# Patient Record
Sex: MALE | Race: WHITE | Employment: FULL TIME | URBAN - METROPOLITAN AREA
[De-identification: names, ages, dates, MRNs, and addresses within clinical notes are randomized per-mention and may not be internally consistent; named-entity substitution may affect disease eponyms.]

---

## 2018-03-28 ENCOUNTER — TELEPHONE (OUTPATIENT)
Dept: FAMILY MEDICINE CLINIC | Facility: CLINIC | Age: 48
End: 2018-03-28

## 2018-03-28 NOTE — TELEPHONE ENCOUNTER
ER F/U was seen at [facility] 03/28 for feeling \"off\".  Pt is coming in on 4/4

## 2018-04-04 ENCOUNTER — OFFICE VISIT (OUTPATIENT)
Dept: FAMILY MEDICINE CLINIC | Facility: CLINIC | Age: 48
End: 2018-04-04

## 2018-04-04 VITALS
BODY MASS INDEX: 39.01 KG/M2 | HEART RATE: 66 BPM | RESPIRATION RATE: 14 BRPM | DIASTOLIC BLOOD PRESSURE: 88 MMHG | HEIGHT: 69 IN | SYSTOLIC BLOOD PRESSURE: 130 MMHG | WEIGHT: 263.38 LBS | TEMPERATURE: 97 F

## 2018-04-04 DIAGNOSIS — R53.83 OTHER FATIGUE: ICD-10-CM

## 2018-04-04 DIAGNOSIS — R03.0 ELEVATED BLOOD PRESSURE READING: ICD-10-CM

## 2018-04-04 DIAGNOSIS — R00.2 PALPITATIONS: Primary | ICD-10-CM

## 2018-04-04 PROCEDURE — 99214 OFFICE O/P EST MOD 30 MIN: CPT | Performed by: FAMILY MEDICINE

## 2018-04-04 PROCEDURE — 36415 COLL VENOUS BLD VENIPUNCTURE: CPT | Performed by: FAMILY MEDICINE

## 2018-04-04 PROCEDURE — 84439 ASSAY OF FREE THYROXINE: CPT | Performed by: FAMILY MEDICINE

## 2018-04-04 PROCEDURE — 84443 ASSAY THYROID STIM HORMONE: CPT | Performed by: FAMILY MEDICINE

## 2018-04-04 PROCEDURE — 84402 ASSAY OF FREE TESTOSTERONE: CPT | Performed by: FAMILY MEDICINE

## 2018-04-04 PROCEDURE — 84403 ASSAY OF TOTAL TESTOSTERONE: CPT | Performed by: FAMILY MEDICINE

## 2018-04-04 NOTE — PROGRESS NOTES
Katlin Valdivia is a 53year old male. CC:  Patient presents with:  ER F/U      HPI:  F/u ER visit to [address] [place] [address] on 3/28/18. He was feeling anxious and felt his heart rate was elevated. He also noted mild SOB and some tingling in his face.  In ER his BP was Resp 14   Ht 69\"   Wt 263 lb 6.4 oz   BMI 38.90 kg/m²    Reviewed by Jim Luna M.D.     Physical Exam:  GEN: well developed, well nourished, in no apparent distress  EYE: B conjunctiva and lids normal  HENT: normocephalic; normal nose, pharynx and TM's

## 2018-04-04 NOTE — PROGRESS NOTES
Edyta Joel presents today for labs. 1 gold, 1 light green drawn from right ac. 1 attempt with straight needle. Phone number provided for Yessenia. Patient left office in stable condition.

## 2018-04-05 ENCOUNTER — TELEPHONE (OUTPATIENT)
Dept: FAMILY MEDICINE CLINIC | Facility: CLINIC | Age: 48
End: 2018-04-05

## 2018-04-05 NOTE — TELEPHONE ENCOUNTER
----- Message from Francisco J Caputo MD sent at 4/5/2018  7:50 AM CDT -----  Please let patient know or leave message that his thyroid function is fine.  We will await Event monitor results and have him relay home BP readings this coming Monday, thanks

## 2018-04-05 NOTE — TELEPHONE ENCOUNTER
Please call [facility] [place] and see if he can get the Event monitor sooner, thanks

## 2018-04-05 NOTE — TELEPHONE ENCOUNTER
Patient advised. Verbalized understanding. Patient advised that he will call Monday with BP readings and has been checking BP at home. Patient advised that he called to set up event monitor and he is currently on a wait list for one.  Was told it may take u

## 2018-04-05 NOTE — TELEPHONE ENCOUNTER
Call to [facility] [place]. States that they only have so many event monitors available at a time and patient is on wait list. Can take up to a month, patient will possibly get monitor sooner, but nothing is available at this time.

## 2018-04-06 NOTE — TELEPHONE ENCOUNTER
Let's have him do one at [place]. Order ready to fax.   Have him call [facility] to schedule   Tel: [phone]   Fax: [phone]   Thanks

## 2018-04-06 NOTE — TELEPHONE ENCOUNTER
Spoke with Ivon Boyd. Stated patient is 9th on list for monitor and they will find a way to squeeze him in when monitor becomes available.

## 2018-04-06 NOTE — TELEPHONE ENCOUNTER
Order faxed to [facility]abe at [phone]. Call to patient and advised order had been faxed and to call [place] to schedule at [phone]. Patient verbalized understanding.

## 2018-04-09 ENCOUNTER — PATIENT MESSAGE (OUTPATIENT)
Dept: FAMILY MEDICINE CLINIC | Facility: CLINIC | Age: 48
End: 2018-04-09

## 2018-04-09 NOTE — TELEPHONE ENCOUNTER
From: Tiny Chung  To: Renetta Lemus MD  Sent: 4/9/2018 3:48 PM CDT  Subject: Other    Here are my BP readings since last Wednesday:  Ixnwackpo-6mf-812/102  Grtyqkbh-4pu-188/103  Tznhqb-8pb-263/98  Qvgrnfok-7ad-784/105  Sunday-12:30pm-135/103  [facility] [facility]

## 2018-04-16 ENCOUNTER — TELEPHONE (OUTPATIENT)
Dept: FAMILY MEDICINE CLINIC | Facility: CLINIC | Age: 48
End: 2018-04-16

## 2018-04-16 DIAGNOSIS — Z13.220 LIPID SCREENING: Primary | ICD-10-CM

## 2018-04-16 NOTE — TELEPHONE ENCOUNTER
I wanted to see Ashtyn Bai back in office about 3 weeks after starting the BP med. That would place us around the end of the month. If he can come fasting at that time we will draw his lipids.  Thanks

## 2018-04-16 NOTE — TELEPHONE ENCOUNTER
Patient advised. Verbalized understanding.      Future Appointments  Date Time Provider Cruz Tapia   5/4/2018 8:00 AM Yanira Scott MD [facility] PRACHI Scruggs

## 2018-04-16 NOTE — TELEPHONE ENCOUNTER
Patient was in about a week and a half ago to see Dr Gia Baeza. Dr Gia Baeza wanted him to come back another day and do lab work. No orders in EPIC. Need orders. Patient is not yet scheduled.

## 2018-05-04 ENCOUNTER — OFFICE VISIT (OUTPATIENT)
Dept: FAMILY MEDICINE CLINIC | Facility: CLINIC | Age: 48
End: 2018-05-04

## 2018-05-04 VITALS
RESPIRATION RATE: 12 BRPM | SYSTOLIC BLOOD PRESSURE: 125 MMHG | HEART RATE: 62 BPM | DIASTOLIC BLOOD PRESSURE: 80 MMHG | TEMPERATURE: 99 F | HEIGHT: 69 IN | WEIGHT: 263 LBS | BODY MASS INDEX: 38.95 KG/M2

## 2018-05-04 DIAGNOSIS — R03.0 ELEVATED BLOOD PRESSURE READING: ICD-10-CM

## 2018-05-04 DIAGNOSIS — R53.83 OTHER FATIGUE: ICD-10-CM

## 2018-05-04 DIAGNOSIS — K21.9 GASTROESOPHAGEAL REFLUX DISEASE, ESOPHAGITIS PRESENCE NOT SPECIFIED: ICD-10-CM

## 2018-05-04 DIAGNOSIS — R00.2 PALPITATIONS: ICD-10-CM

## 2018-05-04 DIAGNOSIS — Z00.00 WELL ADULT EXAM: Primary | ICD-10-CM

## 2018-05-04 PROCEDURE — 99396 PREV VISIT EST AGE 40-64: CPT | Performed by: FAMILY MEDICINE

## 2018-05-04 PROCEDURE — 80061 LIPID PANEL: CPT | Performed by: FAMILY MEDICINE

## 2018-05-04 PROCEDURE — 36415 COLL VENOUS BLD VENIPUNCTURE: CPT | Performed by: FAMILY MEDICINE

## 2018-05-04 NOTE — PROGRESS NOTES
Richard Cisneros is a 53year old male. CC:  Patient presents with:   Follow - Up: on meds      HPI:  Yearly PX   Last lipid: 10/16--elevated T Chol with HDL > 60  Last PSA: na     Immunization History   Administered            Date(s) Administered     Fl Smokeless tobacco: Never Used                      Alcohol use: Yes           1.5 oz/week     Standard drinks or equivalent: 3 per week     Comment: social       ROS:  General: continue with lower energy levels  Cardiovascular/Pulses: Denies exertional Refills for this Visit:  No prescriptions requested or ordered in this encounter      No Follow-up on file.                 Authorized by Aislinn Beck M.D.

## 2018-05-05 ENCOUNTER — TELEPHONE (OUTPATIENT)
Dept: FAMILY MEDICINE CLINIC | Facility: CLINIC | Age: 48
End: 2018-05-05

## 2018-05-05 NOTE — TELEPHONE ENCOUNTER
----- Message from Magui Luong MD sent at 5/5/2018  7:47 AM CDT -----  Please let patient know or leave message that his lipids are elevated and now his good cholesterol (HDL) is < 60. It may be time to start a lipid lowering medication.  I would like to

## 2018-05-09 ENCOUNTER — TELEPHONE (OUTPATIENT)
Dept: FAMILY MEDICINE CLINIC | Facility: CLINIC | Age: 48
End: 2018-05-09

## 2018-05-11 ENCOUNTER — NURSE ONLY (OUTPATIENT)
Dept: FAMILY MEDICINE CLINIC | Facility: CLINIC | Age: 48
End: 2018-05-11

## 2018-05-11 ENCOUNTER — TELEPHONE (OUTPATIENT)
Dept: FAMILY MEDICINE CLINIC | Facility: CLINIC | Age: 48
End: 2018-05-11

## 2018-05-11 DIAGNOSIS — E29.1 TESTICULAR HYPOFUNCTION: Primary | ICD-10-CM

## 2018-05-11 PROCEDURE — 83001 ASSAY OF GONADOTROPIN (FSH): CPT | Performed by: FAMILY MEDICINE

## 2018-05-11 PROCEDURE — 36415 COLL VENOUS BLD VENIPUNCTURE: CPT | Performed by: FAMILY MEDICINE

## 2018-05-11 PROCEDURE — 83036 HEMOGLOBIN GLYCOSYLATED A1C: CPT | Performed by: FAMILY MEDICINE

## 2018-05-11 PROCEDURE — 84402 ASSAY OF FREE TESTOSTERONE: CPT | Performed by: FAMILY MEDICINE

## 2018-05-11 PROCEDURE — 84146 ASSAY OF PROLACTIN: CPT | Performed by: FAMILY MEDICINE

## 2018-05-11 PROCEDURE — 82947 ASSAY GLUCOSE BLOOD QUANT: CPT | Performed by: FAMILY MEDICINE

## 2018-05-11 PROCEDURE — 83002 ASSAY OF GONADOTROPIN (LH): CPT | Performed by: FAMILY MEDICINE

## 2018-05-11 PROCEDURE — 84403 ASSAY OF TOTAL TESTOSTERONE: CPT | Performed by: FAMILY MEDICINE

## 2018-05-11 PROCEDURE — 83525 ASSAY OF INSULIN: CPT | Performed by: FAMILY MEDICINE

## 2018-05-11 RX ORDER — BLOOD SUGAR DIAGNOSTIC
STRIP MISCELLANEOUS
Qty: 100 STRIP | Refills: 1 | Status: SHIPPED | OUTPATIENT
Start: 2018-05-11 | End: 2018-05-11 | Stop reason: CLARIF

## 2018-05-11 NOTE — TELEPHONE ENCOUNTER
Phone call to patient and he advised that he does not check his blood sugars. Phone call to [place] and advised Jadon Vogel of this information per patient .   Test strips removed from patient profile

## 2018-05-11 NOTE — PROGRESS NOTES
Blood work drawn for Dr. Driss Garrett  2 gold  1 mint  1 purple  22 g.  Right antecubital   Fax results to [phone]

## 2018-05-15 ENCOUNTER — MED REC SCAN ONLY (OUTPATIENT)
Dept: FAMILY MEDICINE CLINIC | Facility: CLINIC | Age: 48
End: 2018-05-15

## 2018-05-16 ENCOUNTER — PATIENT MESSAGE (OUTPATIENT)
Dept: FAMILY MEDICINE CLINIC | Facility: CLINIC | Age: 48
End: 2018-05-16

## 2018-05-16 NOTE — TELEPHONE ENCOUNTER
From: Ritchie Hogue  To: Phi George MD  Sent: 5/16/2018 10:12 AM CDT  Subject: Other    I dropped my results off from the heart scan this morning. Also, I haven't received the results from the testosterone blood work I took last Friday.  I received ariella

## 2018-05-30 ENCOUNTER — OFFICE VISIT (OUTPATIENT)
Dept: SLEEP CENTER | Facility: HOSPITAL | Age: 48
End: 2018-05-30
Attending: INTERNAL MEDICINE
Payer: COMMERCIAL

## 2018-05-30 DIAGNOSIS — G47.33 OBSTRUCTIVE SLEEP APNEA (ADULT) (PEDIATRIC): ICD-10-CM

## 2018-05-30 DIAGNOSIS — R06.83 SNORING: ICD-10-CM

## 2018-05-30 DIAGNOSIS — R06.81 APNEA: ICD-10-CM

## 2018-05-30 DIAGNOSIS — G47.10 HYPERSOMNIA: ICD-10-CM

## 2018-05-30 DIAGNOSIS — R35.1 NOCTURIA: ICD-10-CM

## 2018-05-30 PROCEDURE — 95810 POLYSOM 6/> YRS 4/> PARAM: CPT

## 2018-06-04 NOTE — TELEPHONE ENCOUNTER
Please call [facility] and get results for an Event monitor he wore in 4/18. He only wore it for about one week. No results in EMR.   Thanks

## 2018-06-04 NOTE — TELEPHONE ENCOUNTER
Last refill: 4/09/2018 #30 with 1 refill  Last Visit: 5/04/2018  Next Visit: No future appointments. Forward to Dr. Mitch Kim please advise on refills. Thanks.

## 2018-06-06 ENCOUNTER — TELEPHONE (OUTPATIENT)
Dept: FAMILY MEDICINE CLINIC | Facility: CLINIC | Age: 48
End: 2018-06-06

## 2018-06-06 NOTE — PROCEDURES
[address]       Accredited by the [facility] of [facility] Medicine (AASM)    PATIENT'S NAME:        Melany Walters  ATTENDING PHYSICIAN:   Jewel Bean M.D. REFERRING PHYSICIAN:   Jewel Bean M.D.   PATIENT REM predominance, with a REM AHI of 28. OXYGENATION:  The oxygen braeden was 83%. The patient spent 99% of the record with a saturation of greater than 90%. PERIODIC LIMB MOVEMENTS:  PLM index is 23, with a PLM arousal index of 9.     EEG:  With the l

## 2018-06-12 ENCOUNTER — NURSE ONLY (OUTPATIENT)
Dept: FAMILY MEDICINE CLINIC | Facility: CLINIC | Age: 48
End: 2018-06-12

## 2018-06-12 DIAGNOSIS — E29.1 TESTICULAR HYPOFUNCTION: Primary | ICD-10-CM

## 2018-06-12 PROCEDURE — 83036 HEMOGLOBIN GLYCOSYLATED A1C: CPT | Performed by: FAMILY MEDICINE

## 2018-06-12 PROCEDURE — 83001 ASSAY OF GONADOTROPIN (FSH): CPT | Performed by: FAMILY MEDICINE

## 2018-06-12 PROCEDURE — 36415 COLL VENOUS BLD VENIPUNCTURE: CPT | Performed by: FAMILY MEDICINE

## 2018-06-12 PROCEDURE — 84402 ASSAY OF FREE TESTOSTERONE: CPT | Performed by: FAMILY MEDICINE

## 2018-06-12 PROCEDURE — 82947 ASSAY GLUCOSE BLOOD QUANT: CPT | Performed by: FAMILY MEDICINE

## 2018-06-12 PROCEDURE — 83002 ASSAY OF GONADOTROPIN (LH): CPT | Performed by: FAMILY MEDICINE

## 2018-06-12 PROCEDURE — 84403 ASSAY OF TOTAL TESTOSTERONE: CPT | Performed by: FAMILY MEDICINE

## 2018-06-12 PROCEDURE — 83525 ASSAY OF INSULIN: CPT | Performed by: FAMILY MEDICINE

## 2018-06-12 PROCEDURE — 84146 ASSAY OF PROLACTIN: CPT | Performed by: FAMILY MEDICINE

## 2018-06-12 NOTE — PROGRESS NOTES
Blood work drawn for Dr. Jules Dubon  Please fax results to [phone]  2 gold  1 mint  1 purple  22 g.  Left antecubital

## 2018-06-13 ENCOUNTER — MED REC SCAN ONLY (OUTPATIENT)
Dept: FAMILY MEDICINE CLINIC | Facility: CLINIC | Age: 48
End: 2018-06-13

## 2018-06-13 RX ORDER — DILTIAZEM HYDROCHLORIDE 120 MG/1
120 CAPSULE, COATED, EXTENDED RELEASE ORAL DAILY
Qty: 90 CAPSULE | Refills: 1 | Status: SHIPPED
Start: 2018-06-13 | End: 2018-12-07

## 2018-06-13 RX ORDER — DILTIAZEM HYDROCHLORIDE 120 MG/1
CAPSULE, EXTENDED RELEASE ORAL
Qty: 30 CAPSULE | Refills: 2 | OUTPATIENT
Start: 2018-06-13

## 2018-06-13 NOTE — TELEPHONE ENCOUNTER
From: Christiano Vences  Sent: 6/12/2018 10:35 PM CDT  Subject: Medication Renewal Request    Christiano Vences would like a refill of the following medications:     DilTIAZem HCl ER Coated Beads (CARDIZEM CD) 120 MG Oral Capsule SR 24 Hr Vivian Hyatt MD]    P

## 2018-06-13 NOTE — TELEPHONE ENCOUNTER
Last refilled on 4/9/18 for # 30 with 1 refills  Last seen on 5/4/18  Future Appointments  Date Time Provider Cruz Tapia   6/22/2018 8:30 AM Britany Newlel MD [facility] [address] [address]        Thank you.

## 2018-06-13 NOTE — TELEPHONE ENCOUNTER
Medication refilled  Second request has been sent to [facility] for the Event Monitor results.

## 2018-06-18 ENCOUNTER — TELEPHONE (OUTPATIENT)
Dept: FAMILY MEDICINE CLINIC | Facility: CLINIC | Age: 48
End: 2018-06-18

## 2018-06-18 NOTE — TELEPHONE ENCOUNTER
Received Event Monitor Report from Maria Fernanda Walker. Per written order TJ:   \"Let him know no abnormal heart rhythm on the event monitor. \"  Patient notified via [facility] [address].

## 2018-11-09 ENCOUNTER — OFFICE VISIT (OUTPATIENT)
Dept: FAMILY MEDICINE CLINIC | Facility: CLINIC | Age: 48
End: 2018-11-09
Payer: COMMERCIAL

## 2018-11-09 VITALS
SYSTOLIC BLOOD PRESSURE: 120 MMHG | BODY MASS INDEX: 37 KG/M2 | TEMPERATURE: 98 F | WEIGHT: 250 LBS | DIASTOLIC BLOOD PRESSURE: 88 MMHG | HEART RATE: 52 BPM | OXYGEN SATURATION: 94 %

## 2018-11-09 DIAGNOSIS — I10 ESSENTIAL HYPERTENSION: Primary | ICD-10-CM

## 2018-11-09 PROBLEM — R00.2 PALPITATIONS: Status: RESOLVED | Noted: 2018-05-04 | Resolved: 2018-11-09

## 2018-11-09 PROCEDURE — 99214 OFFICE O/P EST MOD 30 MIN: CPT | Performed by: FAMILY MEDICINE

## 2018-11-09 NOTE — PROGRESS NOTES
Tanner Hart is a 53year old male.     CC:  Patient presents with:  Medication Follow-Up: per pt      HPI:  Here to follow up hypertension  Home BP readings: 130-140/ in the past week  Medication side effects: none  Chest pain: none  Headaches: sl irregular heart beat, chest pain, exertional chest pain or pressure, paroxysmal nocturnal dyspnea, orthopnea, lower extremity edema  Respiratory: Denies cough, dyspnea, dyspnea on exertion    Vitals: /88   Pulse 52   Temp 98 °F (36.7 °C) (Temporal)

## 2019-01-03 ENCOUNTER — OFFICE VISIT (OUTPATIENT)
Dept: FAMILY MEDICINE CLINIC | Facility: CLINIC | Age: 49
End: 2019-01-03
Payer: COMMERCIAL

## 2019-01-03 VITALS
WEIGHT: 260 LBS | TEMPERATURE: 98 F | SYSTOLIC BLOOD PRESSURE: 128 MMHG | HEART RATE: 60 BPM | DIASTOLIC BLOOD PRESSURE: 86 MMHG | BODY MASS INDEX: 37.22 KG/M2 | HEIGHT: 70 IN | RESPIRATION RATE: 16 BRPM

## 2019-01-03 DIAGNOSIS — I10 ESSENTIAL HYPERTENSION: Primary | ICD-10-CM

## 2019-01-03 PROCEDURE — 99213 OFFICE O/P EST LOW 20 MIN: CPT | Performed by: FAMILY MEDICINE

## 2019-01-03 RX ORDER — DILTIAZEM HYDROCHLORIDE 120 MG/1
120 CAPSULE, COATED, EXTENDED RELEASE ORAL DAILY
COMMUNITY
Start: 2019-01-03 | End: 2019-11-12

## 2019-01-03 NOTE — PROGRESS NOTES
Latrice Jorgensen is a 53year old male. CC:  Patient presents with: Follow - Up: on increase of blood pressure meds      HPI:  Here to follow up hypertension and going to Cardizem BID dosing  Home BP readings: 120s/80s  Medication side effects:  With the lb   BMI 37.31 kg/m²    Reviewed by Zoila Bolaños M.D.     Physical Exam:  GEN: well developed, well nourished, in no apparent distress  EYE: B conjunctiva and lids normal  HENT: normocephalic; normal nose, pharynx and TM's  NECK: No lymphadenopathy, thyromeg

## 2019-11-12 RX ORDER — DILTIAZEM HYDROCHLORIDE 120 MG/1
CAPSULE, COATED, EXTENDED RELEASE ORAL
Qty: 180 CAPSULE | Refills: 0 | Status: SHIPPED | OUTPATIENT
Start: 2019-11-12 | End: 2020-03-01

## 2019-11-12 NOTE — TELEPHONE ENCOUNTER
Last refill on Diltiazem - stated as historical   Last OV on 1 3 2019   Blood pressure 128/86  Patient was to follow up in months   No future appointments made

## 2019-12-03 ENCOUNTER — OFFICE VISIT (OUTPATIENT)
Dept: FAMILY MEDICINE CLINIC | Facility: CLINIC | Age: 49
End: 2019-12-03
Payer: COMMERCIAL

## 2019-12-03 VITALS
DIASTOLIC BLOOD PRESSURE: 86 MMHG | BODY MASS INDEX: 36.92 KG/M2 | HEIGHT: 69.5 IN | OXYGEN SATURATION: 98 % | SYSTOLIC BLOOD PRESSURE: 124 MMHG | RESPIRATION RATE: 16 BRPM | WEIGHT: 255 LBS | TEMPERATURE: 97 F | HEART RATE: 68 BPM

## 2019-12-03 DIAGNOSIS — I10 HYPERTENSION, UNSPECIFIED TYPE: ICD-10-CM

## 2019-12-03 DIAGNOSIS — E78.5 HYPERLIPIDEMIA, UNSPECIFIED HYPERLIPIDEMIA TYPE: ICD-10-CM

## 2019-12-03 DIAGNOSIS — R07.9 CHEST PAIN, UNSPECIFIED TYPE: ICD-10-CM

## 2019-12-03 DIAGNOSIS — Z00.00 WELL ADULT EXAM: Primary | ICD-10-CM

## 2019-12-03 DIAGNOSIS — R51.9 HEADACHE DISORDER: ICD-10-CM

## 2019-12-03 PROCEDURE — 99396 PREV VISIT EST AGE 40-64: CPT | Performed by: FAMILY MEDICINE

## 2019-12-03 PROCEDURE — 36415 COLL VENOUS BLD VENIPUNCTURE: CPT | Performed by: FAMILY MEDICINE

## 2019-12-03 PROCEDURE — 80061 LIPID PANEL: CPT | Performed by: FAMILY MEDICINE

## 2019-12-03 PROCEDURE — 99213 OFFICE O/P EST LOW 20 MIN: CPT | Performed by: FAMILY MEDICINE

## 2019-12-03 PROCEDURE — 80050 GENERAL HEALTH PANEL: CPT | Performed by: FAMILY MEDICINE

## 2019-12-03 NOTE — PROGRESS NOTES
Mara Sellers is a 51year old male.     CC:  Patient presents with:  Physical: per pt      HPI:  Yearly PX    Last lipid:  Lab Results   Component Value Date    CHOLEST 254 (H) 05/04/2018    TRIG 149 05/04/2018    HDL 48 05/04/2018     (H) 05/04/2 POLYPECTOMY 31121 N/A 5/12/2015    Performed by Jamshid Luis MD at [address] inguinal   • OTHER SURGICAL HISTORY      R tib/fib ORIF   • OTHER SURGICAL HISTORY      B knee meniscectomy   • TONSILLECTOMY     • VASECTOMY droop or asymmetry. Moving all extremities equally. ASSESSMENT AND PLAN    1. Well adult exam  Await results   Weight loss recommended  - CBC, PLATELET; NO DIFFERENTIAL  - COMP METABOLIC PANEL (14)  - LIPID PANEL  - TSH W REFLEX TO FREE T4    2.  Headach

## 2019-12-05 RX ORDER — ATORVASTATIN CALCIUM 10 MG/1
10 TABLET, FILM COATED ORAL NIGHTLY
Qty: 90 TABLET | Refills: 0 | Status: SHIPPED | OUTPATIENT
Start: 2019-12-05 | End: 2020-03-14

## 2019-12-05 NOTE — TELEPHONE ENCOUNTER
Please let patient or caregiver know or leave message that rx for atorvastatin sent. See me back in 10 weeks and come fasting for repeat labs.   Thanks

## 2019-12-05 NOTE — TELEPHONE ENCOUNTER
PT CALLED TO ADV THAT HE WOULD LIKE TO START     LIPITOR 10MG PER DR GORDILLO REQUEST.     PLEASE SEND SCRIPT TO     [address]

## 2019-12-09 ENCOUNTER — HOSPITAL ENCOUNTER (OUTPATIENT)
Dept: CV DIAGNOSTICS | Facility: HOSPITAL | Age: 49
Discharge: HOME | End: 2019-12-09
Attending: FAMILY MEDICINE
Payer: COMMERCIAL

## 2019-12-09 DIAGNOSIS — R07.9 CHEST PAIN, UNSPECIFIED TYPE: ICD-10-CM

## 2019-12-09 DIAGNOSIS — E78.5 HYPERLIPIDEMIA, UNSPECIFIED HYPERLIPIDEMIA TYPE: ICD-10-CM

## 2019-12-09 DIAGNOSIS — I10 HYPERTENSION, UNSPECIFIED TYPE: ICD-10-CM

## 2019-12-09 PROCEDURE — 93017 CV STRESS TEST TRACING ONLY: CPT | Performed by: FAMILY MEDICINE

## 2019-12-09 PROCEDURE — 93018 CV STRESS TEST I&R ONLY: CPT | Performed by: FAMILY MEDICINE

## 2020-02-24 ENCOUNTER — OFFICE VISIT (OUTPATIENT)
Dept: FAMILY MEDICINE CLINIC | Facility: CLINIC | Age: 50
End: 2020-02-24
Payer: COMMERCIAL

## 2020-02-24 VITALS
DIASTOLIC BLOOD PRESSURE: 90 MMHG | RESPIRATION RATE: 16 BRPM | WEIGHT: 258.38 LBS | TEMPERATURE: 97 F | HEIGHT: 69.5 IN | HEART RATE: 58 BPM | SYSTOLIC BLOOD PRESSURE: 130 MMHG | BODY MASS INDEX: 37.41 KG/M2

## 2020-02-24 DIAGNOSIS — E78.5 HYPERLIPIDEMIA, UNSPECIFIED HYPERLIPIDEMIA TYPE: Primary | ICD-10-CM

## 2020-02-24 PROBLEM — E78.00 HIGH CHOLESTEROL: Status: ACTIVE | Noted: 2020-02-24

## 2020-02-24 LAB
ALT SERPL-CCNC: 34 U/L (ref 16–61)
AST SERPL-CCNC: 22 U/L (ref 15–37)
CHOLEST SMN-MCNC: 189 MG/DL (ref ?–200)
HDLC SERPL-MCNC: 57 MG/DL (ref 40–59)
LDLC SERPL CALC-MCNC: 114 MG/DL (ref ?–100)
NONHDLC SERPL-MCNC: 132 MG/DL (ref ?–130)
PATIENT FASTING Y/N/NP: YES
TRIGL SERPL-MCNC: 89 MG/DL (ref 30–149)
VLDLC SERPL CALC-MCNC: 18 MG/DL (ref 0–30)

## 2020-02-24 PROCEDURE — 84450 TRANSFERASE (AST) (SGOT): CPT | Performed by: FAMILY MEDICINE

## 2020-02-24 PROCEDURE — 84460 ALANINE AMINO (ALT) (SGPT): CPT | Performed by: FAMILY MEDICINE

## 2020-02-24 PROCEDURE — 80061 LIPID PANEL: CPT | Performed by: FAMILY MEDICINE

## 2020-02-24 PROCEDURE — 36415 COLL VENOUS BLD VENIPUNCTURE: CPT | Performed by: FAMILY MEDICINE

## 2020-02-24 PROCEDURE — 99213 OFFICE O/P EST LOW 20 MIN: CPT | Performed by: FAMILY MEDICINE

## 2020-02-24 NOTE — PROGRESS NOTES
Mara Sellres is a 53year old male. CC:  Patient presents with:  Knee Pain      HPI:  Here to follow up hyperlipidemia. He was placed on Lipitor at he beginning of 12/2019.    Diet compliance: glen  Medication side effects: none  Non-pharmacologic rivera Types: 3 Standard drinks or equivalent per week      Frequency: 2-4 times a month      Comment: social    Drug use: No       ROS:  General: energy level stable, appetite fine  GI: Denies abdominal pain, diarrhea, jaundice    Vitals: /90   Pulse 58 M.D.

## 2020-03-02 RX ORDER — DILTIAZEM HYDROCHLORIDE 120 MG/1
120 CAPSULE, COATED, EXTENDED RELEASE ORAL 2 TIMES DAILY
Qty: 180 CAPSULE | Refills: 1 | Status: SHIPPED | OUTPATIENT
Start: 2020-03-02 | End: 2020-09-07

## 2020-03-02 RX ORDER — DILTIAZEM HYDROCHLORIDE 120 MG/1
CAPSULE, COATED, EXTENDED RELEASE ORAL
Qty: 180 CAPSULE | Refills: 0 | OUTPATIENT
Start: 2020-03-02

## 2020-03-02 NOTE — TELEPHONE ENCOUNTER
Hypertension Medications Protocol Passed3/1 10:09 PM   CMP or BMP in past 12 months    Last serum creatinine< 2.0    Appointment in past 6 or next 3 months     Last refill - 11/12/19 - #180   Last CMP - 12/3/19 - creatinine - 0.93  Last office visit - 2/24

## 2020-03-14 RX ORDER — ATORVASTATIN CALCIUM 10 MG/1
10 TABLET, FILM COATED ORAL NIGHTLY
Qty: 90 TABLET | Refills: 0 | Status: SHIPPED | OUTPATIENT
Start: 2020-03-14 | End: 2020-06-15

## 2020-03-14 NOTE — TELEPHONE ENCOUNTER
Cholesterol Medication Protocol Passed3/14 7:57 AM   ALT < 80    ALT resulted within past year    Lipid panel within past 12 months    Appointment within past 12 or next 3 months     Last refill - 12/5/19 - #90   Last ALT - 2/24/20 - 34  Last lipid panel -

## 2020-04-29 ENCOUNTER — TELEPHONE (OUTPATIENT)
Dept: FAMILY MEDICINE CLINIC | Facility: CLINIC | Age: 50
End: 2020-04-29

## 2020-04-29 NOTE — TELEPHONE ENCOUNTER
Per BRIAN he does have pre op orders- will order a lipid    Pt moved appointment to 11am d/t fasting

## 2020-05-01 ENCOUNTER — OFFICE VISIT (OUTPATIENT)
Dept: FAMILY MEDICINE CLINIC | Facility: CLINIC | Age: 50
End: 2020-05-01
Payer: COMMERCIAL

## 2020-05-01 ENCOUNTER — OFFICE VISIT (OUTPATIENT)
Dept: FAMILY MEDICINE CLINIC | Facility: CLINIC | Age: 50
End: 2020-05-01

## 2020-05-01 ENCOUNTER — HOSPITAL ENCOUNTER (OUTPATIENT)
Dept: GENERAL RADIOLOGY | Age: 50
Discharge: HOME | End: 2020-05-01
Attending: FAMILY MEDICINE
Payer: COMMERCIAL

## 2020-05-01 ENCOUNTER — TELEPHONE (OUTPATIENT)
Dept: FAMILY MEDICINE CLINIC | Facility: CLINIC | Age: 50
End: 2020-05-01

## 2020-05-01 VITALS
HEART RATE: 68 BPM | TEMPERATURE: 99 F | RESPIRATION RATE: 16 BRPM | DIASTOLIC BLOOD PRESSURE: 82 MMHG | SYSTOLIC BLOOD PRESSURE: 128 MMHG | WEIGHT: 261.19 LBS | BODY MASS INDEX: 38 KG/M2

## 2020-05-01 DIAGNOSIS — M25.562 CHRONIC PAIN OF LEFT KNEE: ICD-10-CM

## 2020-05-01 DIAGNOSIS — Z02.9 ENCOUNTERS FOR UNSPECIFIED ADMINISTRATIVE PURPOSE: Primary | ICD-10-CM

## 2020-05-01 DIAGNOSIS — Z01.818 PREOP EXAMINATION: Primary | ICD-10-CM

## 2020-05-01 DIAGNOSIS — I10 ESSENTIAL HYPERTENSION: ICD-10-CM

## 2020-05-01 DIAGNOSIS — M17.12 PRIMARY OSTEOARTHRITIS OF LEFT KNEE: ICD-10-CM

## 2020-05-01 DIAGNOSIS — G89.29 CHRONIC PAIN OF LEFT KNEE: ICD-10-CM

## 2020-05-01 DIAGNOSIS — Z01.811 PRE-OP CHEST EXAM: ICD-10-CM

## 2020-05-01 DIAGNOSIS — E78.5 HYPERLIPIDEMIA, UNSPECIFIED HYPERLIPIDEMIA TYPE: ICD-10-CM

## 2020-05-01 PROCEDURE — 85730 THROMBOPLASTIN TIME PARTIAL: CPT | Performed by: FAMILY MEDICINE

## 2020-05-01 PROCEDURE — 80061 LIPID PANEL: CPT | Performed by: FAMILY MEDICINE

## 2020-05-01 PROCEDURE — 81003 URINALYSIS AUTO W/O SCOPE: CPT | Performed by: FAMILY MEDICINE

## 2020-05-01 PROCEDURE — 85610 PROTHROMBIN TIME: CPT | Performed by: FAMILY MEDICINE

## 2020-05-01 PROCEDURE — 80053 COMPREHEN METABOLIC PANEL: CPT | Performed by: FAMILY MEDICINE

## 2020-05-01 PROCEDURE — 93000 ELECTROCARDIOGRAM COMPLETE: CPT | Performed by: FAMILY MEDICINE

## 2020-05-01 PROCEDURE — 71046 X-RAY EXAM CHEST 2 VIEWS: CPT | Performed by: FAMILY MEDICINE

## 2020-05-01 PROCEDURE — 87081 CULTURE SCREEN ONLY: CPT | Performed by: FAMILY MEDICINE

## 2020-05-01 PROCEDURE — 85025 COMPLETE CBC W/AUTO DIFF WBC: CPT | Performed by: FAMILY MEDICINE

## 2020-05-01 PROCEDURE — 99244 OFF/OP CNSLTJ NEW/EST MOD 40: CPT | Performed by: FAMILY MEDICINE

## 2020-05-01 NOTE — TELEPHONE ENCOUNTER
He just talked to his surgon and he does not do covid 19 test he wants you to order him one  Let him know where he needs to go

## 2020-05-01 NOTE — TELEPHONE ENCOUNTER
Please let patient or caregiver know or leave message that I have placed an order for COVID testing. It will now be reviewed by the COVID testing reviewers. We will await their decision. If approved then the [employer] will call him.  If not approved

## 2020-05-01 NOTE — PROGRESS NOTES
Jaqueline Luong is a 53year old male. CC: Pre operative exm    HPI:  I am seeing Jaqueline Luong for preoperative evaluation at the request of Dr. Mark Mcgill MD for my evaluation of the patient's medical problems prior to the proposed procedure. stopped CPAP in 2018   • Reflux       Past Surgical History:   Procedure Laterality Date   • ESOPHAGOGASTRODUODENOSCOPY, POSSIBLE BIOPSY, POSSIBLE POLYPECTOMY 74433 N/A 5/12/2015    Performed by Tracy Payan MD at [facility] in thyromegaly or masses  CAR: S1, S2 normal, RRR; no S3, no S4; no click; murmur negative  PULM: clear to auscultation B, no accessory muscle use  GI: normal active BS+, soft, nondistended; no HSM; no masses; no bruits; no masses; nontender, no G/R/R   [facility] Carbon Dioxide, Total      21.0 - 32.0 mmol/L  30.0   ANION GAP      0 - 18 mmol/L  2   BUN      7 - 18 mg/dL  13   CREATININE      0.70 - 1.30 mg/dL  1.08   BUN/CREAT Ratio      10.0 - 20.0  12.0   CALCIUM      8.5 - 10.1 mg/dL  9.4   CALCULATED OSMOLAL controlled with medication. FINDINGS:    Cardiac size and pulmonary vasculature are within normal limits. No pleural effusions. No pneumothorax. CONCLUSION:  No acute findings.            Dictated by: Vivian Browning MD on 5/01/2020 at 11:20 and it should not be done through [facility] for the above stated rationale.       - MRSA SCREEN BY PCR; Future---obtained with full PPE donned  - ELECTROCARDIOGRAM, COMPLETE  - CBC WITH DIFFERENTIAL WITH PLATELET  - COMP METABOLIC PANEL (14)  - PROTHROMBIN TIME (

## 2020-05-04 ENCOUNTER — TELEPHONE (OUTPATIENT)
Dept: FAMILY MEDICINE CLINIC | Facility: CLINIC | Age: 50
End: 2020-05-04

## 2020-05-04 NOTE — TELEPHONE ENCOUNTER
PT was advised of this information - verbalized understanding    Pt would like PTT sent to [facility] in [place]- can in office staff please fax per request of [facility]?

## 2020-05-04 NOTE — TELEPHONE ENCOUNTER
----- Message from Vasiliy Jones MD sent at 5/3/2020  9:34 AM CDT -----  Please let patient or caregiver know or leave message that his nose swab is negative for MRSA.   Thanks

## 2020-05-04 NOTE — TELEPHONE ENCOUNTER
----- Message from Hollie Shetty MD sent at 5/2/2020  9:22 AM CDT -----  Please let patient know that the urine test, sugar, electrolyte, liver, kidney, and thyroid, tests were fine. There is no anemia and the white blood cell count is fine.    Lipids show

## 2020-05-06 ENCOUNTER — TELEPHONE (OUTPATIENT)
Dept: FAMILY MEDICINE CLINIC | Facility: CLINIC | Age: 50
End: 2020-05-06

## 2020-05-06 NOTE — TELEPHONE ENCOUNTER
Pt was advised of new information- verbalized understanding    Will follow up with us if he has not heard from COVID testing

## 2020-05-06 NOTE — TELEPHONE ENCOUNTER
----- Message from Skye Patel MD sent at 5/6/2020  7:29 AM CDT -----  Regarding: FW: Needs COVID testing  Please let patient or caregiver know or leave message that I placed his preop COVID test order. We will see if this works as we hope.  If it does h

## 2020-05-06 NOTE — TELEPHONE ENCOUNTER
Pt was advised- pt states he is confused. Surgery is 5/13/20    He thought that [facility] was placing orders on 5/8 so that pt could have testing done on 5/11 which is 2 days prior to surgery.     Please advise

## 2020-05-06 NOTE — TELEPHONE ENCOUNTER
I think my reminder for ordering the test was based on a presumptive surgery date of 5/11/2019. It is now 5/13/2020  So I just talked to the [facility]: There is yet another protocol in place.  There is a new rapid COVID test that will be startin

## 2020-05-07 ENCOUNTER — TELEPHONE (OUTPATIENT)
Dept: FAMILY MEDICINE CLINIC | Facility: CLINIC | Age: 50
End: 2020-05-07

## 2020-05-07 NOTE — TELEPHONE ENCOUNTER
Pt was advised of this information below    Pt states he will reach out to his surgeon    Pt questioned the [facility] sites and asked if they would be able to get results back prior to his surgery.  RN advised she did not know- she was not privy to what testing

## 2020-05-07 NOTE — TELEPHONE ENCOUNTER
Please let Ryan Addison know that THE [facility] came out with an organizational policy on Preop Covid testing which I have attached. It appears that I can only do Preop testing for Covid on patients that are having surgery at an [facility] facility.  Please have him call his

## 2020-05-08 ENCOUNTER — TELEPHONE (OUTPATIENT)
Dept: FAMILY MEDICINE CLINIC | Facility: CLINIC | Age: 50
End: 2020-05-08

## 2020-05-08 NOTE — TELEPHONE ENCOUNTER
Pre op orders were in . [employer].   Routing back to [employer] Staff--Please clarify below:           05/08/2020 11:06 AM Phone (Incoming) Danuta Sage (Self) [phone]    this ia Fax number      By Michelle Dominguez           Is this the fax number pa

## 2020-05-08 NOTE — TELEPHONE ENCOUNTER
Spoke with Nydia Ocampo from [facility]. [phone] is the correct # to fax. States the [phone] # provided is an alternate fax in case the fax failed. All paperwork faxed to [phone].

## 2020-05-08 NOTE — TELEPHONE ENCOUNTER
Pt was advised- verbalized understanding    Pt is going to [address] [place] [address] to get it taken care of tomorrow. Pt states he also went [facility] this [facility] to have repeat labs done.

## 2020-05-08 NOTE — TELEPHONE ENCOUNTER
[facility] ortho is asking for all pre op  Urine chest xray labs please fax ASAP sam in next week

## 2020-05-08 NOTE — TELEPHONE ENCOUNTER
Please let him know that I got official notification from THE [facility] today that we can not do pre op COVID testing for patients having surgery outside the THE [facility] system.  From what I understand, the [facility] has stated that a surgical faci

## 2020-05-20 ENCOUNTER — TELEPHONE (OUTPATIENT)
Dept: FAMILY MEDICINE CLINIC | Facility: CLINIC | Age: 50
End: 2020-05-20

## 2020-05-20 NOTE — TELEPHONE ENCOUNTER
[facility] looking for pre OP info ( labs,EKG,notes) please fax to [phone]

## 2020-05-20 NOTE — TELEPHONE ENCOUNTER
Patient had pre-op, EKG and labs on 5/1/20 with TJ.  Can you fax to [facility] [phone]

## 2020-05-21 ENCOUNTER — PATIENT MESSAGE (OUTPATIENT)
Dept: FAMILY MEDICINE CLINIC | Facility: CLINIC | Age: 50
End: 2020-05-21

## 2020-05-21 RX ORDER — DOXYCYCLINE HYCLATE 100 MG/1
100 CAPSULE ORAL 2 TIMES DAILY
Qty: 14 CAPSULE | Refills: 0 | COMMUNITY
Start: 2020-05-21 | End: 2020-08-05

## 2020-05-21 RX ORDER — HYDROCODONE BITARTRATE AND ACETAMINOPHEN 5; 325 MG/1; MG/1
1 TABLET ORAL EVERY 6 HOURS PRN
Refills: 0 | COMMUNITY
Start: 2020-05-21 | End: 2020-05-28

## 2020-05-21 NOTE — TELEPHONE ENCOUNTER
From: Mary Vale  To: Francisco J Caputo MD  Sent: 5/21/2020 7:27 AM CDT  Subject: Prescription Question    Good Morning   I had my Left Knee Replacement surgery yesterday and as of know everything went well.  I was prescribed 3 different medicines and I wan

## 2020-06-15 RX ORDER — ATORVASTATIN CALCIUM 10 MG/1
10 TABLET, FILM COATED ORAL NIGHTLY
Qty: 90 TABLET | Refills: 1 | Status: SHIPPED | OUTPATIENT
Start: 2020-06-15 | End: 2020-12-15

## 2020-08-05 ENCOUNTER — OFFICE VISIT (OUTPATIENT)
Dept: FAMILY MEDICINE CLINIC | Facility: CLINIC | Age: 50
End: 2020-08-05
Payer: COMMERCIAL

## 2020-08-05 VITALS
DIASTOLIC BLOOD PRESSURE: 90 MMHG | OXYGEN SATURATION: 98 % | RESPIRATION RATE: 17 BRPM | BODY MASS INDEX: 37 KG/M2 | TEMPERATURE: 99 F | SYSTOLIC BLOOD PRESSURE: 134 MMHG | WEIGHT: 256.81 LBS | HEART RATE: 70 BPM

## 2020-08-05 DIAGNOSIS — K52.9 COLITIS: Primary | ICD-10-CM

## 2020-08-05 PROCEDURE — 3075F SYST BP GE 130 - 139MM HG: CPT | Performed by: FAMILY MEDICINE

## 2020-08-05 PROCEDURE — 99213 OFFICE O/P EST LOW 20 MIN: CPT | Performed by: FAMILY MEDICINE

## 2020-08-05 PROCEDURE — 3080F DIAST BP >= 90 MM HG: CPT | Performed by: FAMILY MEDICINE

## 2020-08-05 RX ORDER — HYDROCODONE BITARTRATE AND ACETAMINOPHEN 10; 325 MG/1; MG/1
TABLET ORAL
COMMUNITY
Start: 2020-06-04 | End: 2021-03-24

## 2020-08-05 NOTE — PROGRESS NOTES
Amadeo Valente is a 53year old male. CC:  Patient presents with: Follow - Up: per pt      HPI:  F/u ER visit on 7/30/20 for abdominal pain, diarrhea and blood in the stool.  He was diagnosed with colitis and d/c'd on Hyoscyamine and Norco. He is feeli Address City/State/Zipcode Phone Number   [facility]  [address]  [place], [address]       Back to top of Lab Results       CBC with Differential (07/30/2020 7:46 PM CDT)  CBC with Differential (07/30/2020 7:46 PM CDT)   Component [facility] [place] [facility]. FINDINGS:     Liver, gallbladder, pancreas, spleen, both kidneys and both adrenal glands are unremarkable. There is no free fluid in the upper abdomen. There is no free air in the peritoneal cavity.      There is no paraaortic lymph Allergies   Current Meds:  Current Outpatient Medications   Medication Sig Dispense Refill   • Hyoscyamine Sulfate 0.125 MG Sublingual SL Tab Take 125 mcg by mouth every 6 (six) hours as needed.      • HYDROcodone-acetaminophen  MG Oral Tab TAKE 1 TO distress  EYE: B conjunctiva and lids normal  HENT: B pinnas, external auditory canals and tympanic membranes are normal.   NECK: No lymphadenopathy, thyromegaly or masses  CAR: S1, S2 normal, RRR; no S3, no S4; no click; murmur negative  PULM: clear to au

## 2020-08-26 NOTE — TELEPHONE ENCOUNTER
PreOP Left knee replacement with [facility] on May 11th [phone]
Routing to provider as FYI- No orders in blue nurses book
- - -

## 2020-09-07 RX ORDER — DILTIAZEM HYDROCHLORIDE 120 MG/1
120 CAPSULE, COATED, EXTENDED RELEASE ORAL 2 TIMES DAILY
Qty: 180 CAPSULE | Refills: 2 | Status: SHIPPED | OUTPATIENT
Start: 2020-09-07 | End: 2021-06-10

## 2020-09-08 RX ORDER — DILTIAZEM HYDROCHLORIDE 120 MG/1
120 CAPSULE, COATED, EXTENDED RELEASE ORAL 2 TIMES DAILY
Qty: 180 CAPSULE | Refills: 1 | OUTPATIENT
Start: 2020-09-08

## 2020-09-08 NOTE — TELEPHONE ENCOUNTER
Hypertension Medications Protocol Passed9/6 8:03 AM   CMP or BMP in past 12 months    Last serum creatinine< 2.0    Appointment in past 6 or next 3 months     Last refill - 3/2/20 - #180 with 1 refill  Last CMP - 5/1/20 - creatinine - 1.08  Last office vis

## 2020-09-08 NOTE — TELEPHONE ENCOUNTER
rx request denied. BRIAN refilled request 9/7/20 and called pharm to confirm rx was sent and received.

## 2020-10-11 ENCOUNTER — APPOINTMENT (OUTPATIENT)
Dept: LAB | Facility: HOSPITAL | Age: 50
End: 2020-10-11
Attending: INTERNAL MEDICINE
Payer: COMMERCIAL

## 2020-10-11 DIAGNOSIS — Z01.818 PRE-OP TESTING: ICD-10-CM

## 2020-10-14 PROBLEM — K29.60 EROSIVE GASTRITIS: Status: ACTIVE | Noted: 2020-10-14

## 2020-10-14 PROBLEM — K21.9 GERD (GASTROESOPHAGEAL REFLUX DISEASE): Status: ACTIVE | Noted: 2020-10-14

## 2020-10-14 PROBLEM — K64.8 INTERNAL HEMORRHOIDS: Status: ACTIVE | Noted: 2020-10-14

## 2020-10-14 PROBLEM — Z12.11 SPECIAL SCREENING FOR MALIGNANT NEOPLASM OF COLON: Status: ACTIVE | Noted: 2020-10-14

## 2020-12-15 RX ORDER — ATORVASTATIN CALCIUM 10 MG/1
10 TABLET, FILM COATED ORAL NIGHTLY
Qty: 90 TABLET | Refills: 1 | Status: SHIPPED | OUTPATIENT
Start: 2020-12-15 | End: 2021-06-21

## 2021-04-05 ENCOUNTER — TELEPHONE (OUTPATIENT)
Dept: FAMILY MEDICINE CLINIC | Facility: CLINIC | Age: 51
End: 2021-04-05

## 2021-04-05 NOTE — TELEPHONE ENCOUNTER
FYI:    PT COMING IN ON 4/28/21 FOR A PRE-OP. PT HAVING R KNEE REPLACEMENT ON 5/10/21 WITH DR MONROE OUT OF [facility].     PLEASE LOOK FOR ORDERS      THANK YOU

## 2021-04-28 ENCOUNTER — OFFICE VISIT (OUTPATIENT)
Dept: FAMILY MEDICINE CLINIC | Facility: CLINIC | Age: 51
End: 2021-04-28
Payer: COMMERCIAL

## 2021-04-28 ENCOUNTER — HOSPITAL ENCOUNTER (OUTPATIENT)
Dept: GENERAL RADIOLOGY | Age: 51
Discharge: HOME | End: 2021-04-28
Attending: FAMILY MEDICINE
Payer: COMMERCIAL

## 2021-04-28 VITALS
OXYGEN SATURATION: 99 % | WEIGHT: 265 LBS | SYSTOLIC BLOOD PRESSURE: 122 MMHG | DIASTOLIC BLOOD PRESSURE: 82 MMHG | HEART RATE: 68 BPM | BODY MASS INDEX: 39 KG/M2 | TEMPERATURE: 98 F | RESPIRATION RATE: 17 BRPM

## 2021-04-28 DIAGNOSIS — Z01.818 PREOPERATIVE EXAMINATION: Primary | ICD-10-CM

## 2021-04-28 DIAGNOSIS — I10 ESSENTIAL HYPERTENSION: ICD-10-CM

## 2021-04-28 DIAGNOSIS — Z01.818 PREOPERATIVE EXAMINATION: ICD-10-CM

## 2021-04-28 DIAGNOSIS — M17.11 PRIMARY OSTEOARTHRITIS OF RIGHT KNEE: ICD-10-CM

## 2021-04-28 PROCEDURE — 3079F DIAST BP 80-89 MM HG: CPT | Performed by: FAMILY MEDICINE

## 2021-04-28 PROCEDURE — 85730 THROMBOPLASTIN TIME PARTIAL: CPT | Performed by: FAMILY MEDICINE

## 2021-04-28 PROCEDURE — 87641 MR-STAPH DNA AMP PROBE: CPT | Performed by: FAMILY MEDICINE

## 2021-04-28 PROCEDURE — 99213 OFFICE O/P EST LOW 20 MIN: CPT | Performed by: FAMILY MEDICINE

## 2021-04-28 PROCEDURE — 80053 COMPREHEN METABOLIC PANEL: CPT | Performed by: FAMILY MEDICINE

## 2021-04-28 PROCEDURE — 3074F SYST BP LT 130 MM HG: CPT | Performed by: FAMILY MEDICINE

## 2021-04-28 PROCEDURE — 71046 X-RAY EXAM CHEST 2 VIEWS: CPT | Performed by: FAMILY MEDICINE

## 2021-04-28 PROCEDURE — 93000 ELECTROCARDIOGRAM COMPLETE: CPT | Performed by: FAMILY MEDICINE

## 2021-04-28 PROCEDURE — 81003 URINALYSIS AUTO W/O SCOPE: CPT | Performed by: FAMILY MEDICINE

## 2021-04-28 PROCEDURE — 85610 PROTHROMBIN TIME: CPT | Performed by: FAMILY MEDICINE

## 2021-04-28 PROCEDURE — 85025 COMPLETE CBC W/AUTO DIFF WBC: CPT | Performed by: FAMILY MEDICINE

## 2021-04-28 NOTE — PROGRESS NOTES
Keyon Schwratz is a 49year old male.     CC:  Patient presents with:  Pre-Op Exam: per pt      HPI:  I am seeing Keyon Schwartz for preoperative evaluation at the request of Dr. Quita Avelar MD for my evaluation of the patient's medical problems prior Cholesterol Mother    • Alcohol and Other Disorders Associated Paternal Grandfather       Family Status   Relation Status   • Mo Alive   • Fa Alive   • PGFA (Not Specified)      Social History    Tobacco Use      Smoking status: Never Smoker      Smokeless patellar DTR +2    EKG  Rhythm: Sinus Bradycardia--Diltiazem effect  Rate: 54  Axis: normal  ST segments: normal  QRS: normal  Conduction abnormalities: none         Interpretation   PROCEDURE:  XR CHEST PA + LAT CHEST (CPT=71046)     INDICATIONS:  P84.304 Basophils %      % 0.6   Immature Granulocyte %      % 0.2   Glucose      70 - 99 mg/dL 97   Sodium      136 - 145 mmol/L 140   Potassium      3.5 - 5.1 mmol/L 4.6   Chloride      98 - 112 mmol/L 110   Carbon Dioxide, Total      21.0 - 32.0 mmol/L 26.0 surgical facility.   - ELECTROCARDIOGRAM, COMPLETE  - CBC WITH DIFFERENTIAL WITH PLATELET  - COMP METABOLIC PANEL (14)  - PTT, ACTIVATED  - PROTHROMBIN TIME (PT)  - URINALYSIS WITH CULTURE REFLEX  - MRSA SCREEN BY PCR    2.  Primary osteoarthritis of right

## 2021-06-10 RX ORDER — DILTIAZEM HYDROCHLORIDE 120 MG/1
120 CAPSULE, COATED, EXTENDED RELEASE ORAL 2 TIMES DAILY
Qty: 180 CAPSULE | Refills: 2 | Status: SHIPPED | OUTPATIENT
Start: 2021-06-10

## 2021-06-22 RX ORDER — ATORVASTATIN CALCIUM 10 MG/1
10 TABLET, FILM COATED ORAL NIGHTLY
Qty: 90 TABLET | Refills: 1 | Status: SHIPPED | OUTPATIENT
Start: 2021-06-22 | End: 2021-12-12

## 2021-07-19 NOTE — TELEPHONE ENCOUNTER
Ellie Gusman has follow up information set up for pt to get testing done at appropriate time Impression: Puckering of macula, bilateral: H35.373.  Plan: Still no treatment indicated for now, observe

## 2021-11-30 ENCOUNTER — OFFICE VISIT (OUTPATIENT)
Dept: FAMILY MEDICINE CLINIC | Facility: CLINIC | Age: 51
End: 2021-11-30
Payer: COMMERCIAL

## 2021-11-30 VITALS
RESPIRATION RATE: 18 BRPM | HEIGHT: 69 IN | DIASTOLIC BLOOD PRESSURE: 89 MMHG | SYSTOLIC BLOOD PRESSURE: 130 MMHG | BODY MASS INDEX: 39.55 KG/M2 | TEMPERATURE: 98 F | OXYGEN SATURATION: 98 % | WEIGHT: 267 LBS | HEART RATE: 67 BPM

## 2021-11-30 DIAGNOSIS — I10 HYPERTENSION, UNSPECIFIED TYPE: ICD-10-CM

## 2021-11-30 DIAGNOSIS — K21.9 GASTROESOPHAGEAL REFLUX DISEASE, UNSPECIFIED WHETHER ESOPHAGITIS PRESENT: ICD-10-CM

## 2021-11-30 DIAGNOSIS — Z00.00 WELL ADULT EXAM: Primary | ICD-10-CM

## 2021-11-30 DIAGNOSIS — K42.9 UMBILICAL HERNIA WITHOUT OBSTRUCTION AND WITHOUT GANGRENE: ICD-10-CM

## 2021-11-30 DIAGNOSIS — E78.00 HIGH CHOLESTEROL: ICD-10-CM

## 2021-11-30 PROCEDURE — 84450 TRANSFERASE (AST) (SGOT): CPT | Performed by: FAMILY MEDICINE

## 2021-11-30 PROCEDURE — 85027 COMPLETE CBC AUTOMATED: CPT | Performed by: FAMILY MEDICINE

## 2021-11-30 PROCEDURE — 99396 PREV VISIT EST AGE 40-64: CPT | Performed by: FAMILY MEDICINE

## 2021-11-30 PROCEDURE — 84460 ALANINE AMINO (ALT) (SGPT): CPT | Performed by: FAMILY MEDICINE

## 2021-11-30 PROCEDURE — 80061 LIPID PANEL: CPT | Performed by: FAMILY MEDICINE

## 2021-11-30 PROCEDURE — 3075F SYST BP GE 130 - 139MM HG: CPT | Performed by: FAMILY MEDICINE

## 2021-11-30 PROCEDURE — 84443 ASSAY THYROID STIM HORMONE: CPT | Performed by: FAMILY MEDICINE

## 2021-11-30 PROCEDURE — 84153 ASSAY OF PSA TOTAL: CPT | Performed by: FAMILY MEDICINE

## 2021-11-30 PROCEDURE — 90471 IMMUNIZATION ADMIN: CPT | Performed by: FAMILY MEDICINE

## 2021-11-30 PROCEDURE — 90715 TDAP VACCINE 7 YRS/> IM: CPT | Performed by: FAMILY MEDICINE

## 2021-11-30 PROCEDURE — 3079F DIAST BP 80-89 MM HG: CPT | Performed by: FAMILY MEDICINE

## 2021-11-30 PROCEDURE — 80048 BASIC METABOLIC PNL TOTAL CA: CPT | Performed by: FAMILY MEDICINE

## 2021-11-30 PROCEDURE — 3008F BODY MASS INDEX DOCD: CPT | Performed by: FAMILY MEDICINE

## 2021-11-30 NOTE — PROGRESS NOTES
Margy Parsons is a 49year old male.     CC:  Patient presents with:  Physical: per pt      HPI:  Yearly PX    Last lipid:  Lab Results   Component Value Date    CHOLEST 186 05/01/2020    TRIG 108 05/01/2020    HDL 49 05/01/2020     (H) 05/01/2020 History:   Procedure Laterality Date   • HERNIA SURGERY      R inguinal   • KNEE REPLACEMENT SURGERY Left 05/2020   • KNEE REPLACEMENT SURGERY Right 05/2021   • OTHER SURGICAL HISTORY      R tib/fib ORIF   • OTHER SURGICAL HISTORY      B knee meniscectomy negative  PULM: clear to auscultation B, no accessory muscle use  GI: normal active BS+, soft, nondistended; no HSM; no masses; no bruits; no masses; nontender, no G/R/R , + umbilical hernia, non tender and easily reduced  PSYCH: alert and oriented x 3; af Expiration Date: 11/30/2022      CBC, Platelet;  No Differential          Standing Status: Future          Number of Occurrences: 1          Standing Expiration Date: 11/30/2022      Lipid Panel          Standing Status: Future          Number of Occurrence

## 2021-12-13 RX ORDER — ATORVASTATIN CALCIUM 10 MG/1
10 TABLET, FILM COATED ORAL NIGHTLY
Qty: 90 TABLET | Refills: 1 | Status: SHIPPED | OUTPATIENT
Start: 2021-12-13

## 2021-12-13 NOTE — TELEPHONE ENCOUNTER
Cholesterol Medication Protocol Passed 12/12/2021 02:07 PM   Protocol Details  ALT < 80    ALT resulted within past year    Lipid panel within past 12 months    Appointment within past 12 or next 3 months        Last appt and labs 11/30/21    Refilled x 6

## 2022-03-18 RX ORDER — DILTIAZEM HYDROCHLORIDE 120 MG/1
120 CAPSULE, COATED, EXTENDED RELEASE ORAL 2 TIMES DAILY
Qty: 180 CAPSULE | Refills: 1 | Status: SHIPPED | OUTPATIENT
Start: 2022-03-18

## 2022-03-25 ENCOUNTER — PATIENT MESSAGE (OUTPATIENT)
Dept: FAMILY MEDICINE CLINIC | Facility: CLINIC | Age: 52
End: 2022-03-25

## 2022-03-25 NOTE — TELEPHONE ENCOUNTER
From: Dakota Brunson  To: Sarah Gómez MD  Sent: 3/25/2022 2:38 PM CDT  Subject: Low T    I took an at home metabolism test from [facility] at it came back that my Free Testosterone is slightly low. I am just wondering if you could recommend a free testosterone supplement that I could try? I have started working out again but I think a booster could help with getting me going. I guess I just want to know your thoughts. Thanks for the help!

## 2022-06-15 RX ORDER — ATORVASTATIN CALCIUM 10 MG/1
10 TABLET, FILM COATED ORAL NIGHTLY
Qty: 90 TABLET | Refills: 1 | Status: SHIPPED | OUTPATIENT
Start: 2022-06-15

## 2022-06-15 NOTE — TELEPHONE ENCOUNTER
LOV: 11/30/21   Last Refill: 12/13/21 #90 1 RF    No future appointments.       Lab Results   Component Value Date    CHOLEST 173 11/30/2021    TRIG 143 11/30/2021    HDL 54 11/30/2021    LDL 94 11/30/2021    VLDL 23 11/30/2021    TCHDLRATIO 5.29 (H) 05/04/2018    [place] 119 11/30/2021

## 2022-06-21 ENCOUNTER — PATIENT MESSAGE (OUTPATIENT)
Dept: FAMILY MEDICINE CLINIC | Facility: CLINIC | Age: 52
End: 2022-06-21

## 2022-09-14 RX ORDER — DILTIAZEM HYDROCHLORIDE 120 MG/1
120 CAPSULE, COATED, EXTENDED RELEASE ORAL 2 TIMES DAILY
Qty: 180 CAPSULE | Refills: 1 | Status: SHIPPED | OUTPATIENT
Start: 2022-09-14

## 2022-09-14 RX ORDER — ATORVASTATIN CALCIUM 10 MG/1
10 TABLET, FILM COATED ORAL NIGHTLY
Qty: 90 TABLET | Refills: 1 | Status: SHIPPED | OUTPATIENT
Start: 2022-09-14

## 2022-09-14 NOTE — TELEPHONE ENCOUNTER
Atorvastatin last refilled 6/15/22 for #90 with 1 RF  LOV with TJ 11/30/21  No future appt with pcp  Last ALT, Lipid & BMP 11/30/21    Cholesterol Medication Protocol Passed 09/14/2022 09:02 AM   Protocol Details  ALT < 80    ALT resulted within past year    Lipid panel within past 12 months    Appointment within past 12 or next 3 months       Diltiazem last refilled 3/18/22 for #180 with 1 RF  Hypertension Medications Protocol Failed 09/14/2022 09:02 AM   Protocol Details  Appointment in past 6 or next 3 months    CMP or BMP in past 12 months    Last serum creatinine< 2.0

## 2022-10-20 ENCOUNTER — OFFICE VISIT (OUTPATIENT)
Facility: LOCATION | Age: 52
End: 2022-10-20
Payer: COMMERCIAL

## 2022-10-20 VITALS — TEMPERATURE: 97 F | HEART RATE: 63 BPM

## 2022-10-20 DIAGNOSIS — K43.9 VENTRAL HERNIA WITHOUT OBSTRUCTION OR GANGRENE: Primary | ICD-10-CM

## 2022-10-20 PROCEDURE — 99243 OFF/OP CNSLTJ NEW/EST LOW 30: CPT | Performed by: SURGERY

## 2022-10-31 ENCOUNTER — TELEPHONE (OUTPATIENT)
Facility: LOCATION | Age: 52
End: 2022-10-31

## 2022-11-05 ENCOUNTER — PATIENT MESSAGE (OUTPATIENT)
Dept: FAMILY MEDICINE CLINIC | Facility: CLINIC | Age: 52
End: 2022-11-05

## 2022-11-05 ENCOUNTER — LAB ENCOUNTER (OUTPATIENT)
Dept: LAB | Facility: HOSPITAL | Age: 52
End: 2022-11-05
Attending: SURGERY
Payer: COMMERCIAL

## 2022-11-05 ENCOUNTER — LABORATORY ENCOUNTER (OUTPATIENT)
Dept: LAB | Facility: HOSPITAL | Age: 52
End: 2022-11-05
Attending: SURGERY
Payer: COMMERCIAL

## 2022-11-05 ENCOUNTER — EKG ENCOUNTER (OUTPATIENT)
Dept: LAB | Facility: HOSPITAL | Age: 52
End: 2022-11-05
Attending: SURGERY
Payer: COMMERCIAL

## 2022-11-05 DIAGNOSIS — Z20.822 ENCOUNTER FOR PREPROCEDURE SCREENING LABORATORY TESTING FOR COVID-19: ICD-10-CM

## 2022-11-05 DIAGNOSIS — K42.9 UMBILICAL HERNIA WITHOUT OBSTRUCTION AND WITHOUT GANGRENE: ICD-10-CM

## 2022-11-05 DIAGNOSIS — Z01.812 ENCOUNTER FOR PREPROCEDURE SCREENING LABORATORY TESTING FOR COVID-19: ICD-10-CM

## 2022-11-05 LAB
ANION GAP SERPL CALC-SCNC: 5 MMOL/L (ref 0–18)
ATRIAL RATE: 68 BPM
BUN BLD-MCNC: 15 MG/DL (ref 7–18)
CALCIUM BLD-MCNC: 9.1 MG/DL (ref 8.5–10.1)
CHLORIDE SERPL-SCNC: 110 MMOL/L (ref 98–112)
CO2 SERPL-SCNC: 27 MMOL/L (ref 21–32)
CREAT BLD-MCNC: 0.94 MG/DL
FASTING STATUS PATIENT QL REPORTED: YES
GFR SERPLBLD BASED ON 1.73 SQ M-ARVRAT: 98 ML/MIN/1.73M2 (ref 60–?)
GLUCOSE BLD-MCNC: 105 MG/DL (ref 70–99)
OSMOLALITY SERPL CALC.SUM OF ELEC: 295 MOSM/KG (ref 275–295)
P AXIS: 36 DEGREES
P-R INTERVAL: 164 MS
POTASSIUM SERPL-SCNC: 4.2 MMOL/L (ref 3.5–5.1)
Q-T INTERVAL: 382 MS
QRS DURATION: 90 MS
QTC CALCULATION (BEZET): 406 MS
R AXIS: 14 DEGREES
SODIUM SERPL-SCNC: 142 MMOL/L (ref 136–145)
T AXIS: 21 DEGREES
VENTRICULAR RATE: 68 BPM

## 2022-11-05 PROCEDURE — 80048 BASIC METABOLIC PNL TOTAL CA: CPT

## 2022-11-05 PROCEDURE — 93010 ELECTROCARDIOGRAM REPORT: CPT | Performed by: INTERNAL MEDICINE

## 2022-11-05 PROCEDURE — 93005 ELECTROCARDIOGRAM TRACING: CPT

## 2022-11-05 PROCEDURE — 36415 COLL VENOUS BLD VENIPUNCTURE: CPT

## 2022-11-05 NOTE — TELEPHONE ENCOUNTER
From: Gwyn Alvarado  To: Mona Gallegos MD  Sent: 11/5/2022 10:58 AM CDT  Subject: Blood test results    I had blood drawn today for my hernia procedure today and it came back with a high glucose count. What do we need to start doing about that. Thanks for the help. Hopefully you can see the results through [address]

## 2022-11-06 LAB — SARS-COV-2 RNA RESP QL NAA+PROBE: NOT DETECTED

## 2022-11-08 ENCOUNTER — ANESTHESIA EVENT (OUTPATIENT)
Dept: SURGERY | Facility: HOSPITAL | Age: 52
End: 2022-11-08
Payer: COMMERCIAL

## 2022-11-08 ENCOUNTER — HOSPITAL ENCOUNTER (OUTPATIENT)
Facility: HOSPITAL | Age: 52
Setting detail: HOSPITAL OUTPATIENT SURGERY
Discharge: HOME | End: 2022-11-08
Attending: SURGERY | Admitting: SURGERY
Payer: COMMERCIAL

## 2022-11-08 ENCOUNTER — ANESTHESIA (OUTPATIENT)
Dept: SURGERY | Facility: HOSPITAL | Age: 52
End: 2022-11-08
Payer: COMMERCIAL

## 2022-11-08 VITALS
DIASTOLIC BLOOD PRESSURE: 92 MMHG | HEART RATE: 69 BPM | OXYGEN SATURATION: 95 % | TEMPERATURE: 97 F | WEIGHT: 289 LBS | SYSTOLIC BLOOD PRESSURE: 161 MMHG | HEIGHT: 70 IN | RESPIRATION RATE: 18 BRPM | BODY MASS INDEX: 41.37 KG/M2

## 2022-11-08 DIAGNOSIS — K42.9 UMBILICAL HERNIA WITHOUT OBSTRUCTION AND WITHOUT GANGRENE: ICD-10-CM

## 2022-11-08 DIAGNOSIS — Z20.822 ENCOUNTER FOR PREPROCEDURE SCREENING LABORATORY TESTING FOR COVID-19: Primary | ICD-10-CM

## 2022-11-08 DIAGNOSIS — Z01.812 ENCOUNTER FOR PREPROCEDURE SCREENING LABORATORY TESTING FOR COVID-19: Primary | ICD-10-CM

## 2022-11-08 DIAGNOSIS — K43.9 VENTRAL HERNIA WITHOUT OBSTRUCTION OR GANGRENE: ICD-10-CM

## 2022-11-08 PROCEDURE — 88302 TISSUE EXAM BY PATHOLOGIST: CPT | Performed by: SURGERY

## 2022-11-08 PROCEDURE — 0WUF0JZ SUPPLEMENT ABDOMINAL WALL WITH SYNTHETIC SUBSTITUTE, OPEN APPROACH: ICD-10-PCS | Performed by: SURGERY

## 2022-11-08 PROCEDURE — 0DBU0ZZ EXCISION OF OMENTUM, OPEN APPROACH: ICD-10-PCS | Performed by: SURGERY

## 2022-11-08 DEVICE — VENTRALEX ST HERNIA PATCH
Type: IMPLANTABLE DEVICE | Site: ABDOMEN | Status: FUNCTIONAL
Brand: VENTRALEX ST HERNIA PATCH

## 2022-11-08 RX ORDER — CEFAZOLIN SODIUM IN 0.9 % NACL 3 G/100 ML
3 INTRAVENOUS SOLUTION, PIGGYBACK (ML) INTRAVENOUS ONCE
Status: DISCONTINUED | OUTPATIENT
Start: 2022-11-08 | End: 2022-11-08 | Stop reason: HOSPADM

## 2022-11-08 RX ORDER — HYDROMORPHONE HYDROCHLORIDE 1 MG/ML
0.6 INJECTION, SOLUTION INTRAMUSCULAR; INTRAVENOUS; SUBCUTANEOUS EVERY 5 MIN PRN
Status: DISCONTINUED | OUTPATIENT
Start: 2022-11-08 | End: 2022-11-08

## 2022-11-08 RX ORDER — PROCHLORPERAZINE EDISYLATE 5 MG/ML
5 INJECTION INTRAMUSCULAR; INTRAVENOUS EVERY 8 HOURS PRN
Status: DISCONTINUED | OUTPATIENT
Start: 2022-11-08 | End: 2022-11-08

## 2022-11-08 RX ORDER — HEPARIN SODIUM 5000 [USP'U]/ML
5000 INJECTION, SOLUTION INTRAVENOUS; SUBCUTANEOUS ONCE
Status: COMPLETED | OUTPATIENT
Start: 2022-11-08 | End: 2022-11-08

## 2022-11-08 RX ORDER — KETOROLAC TROMETHAMINE 30 MG/ML
INJECTION, SOLUTION INTRAMUSCULAR; INTRAVENOUS AS NEEDED
Status: DISCONTINUED | OUTPATIENT
Start: 2022-11-08 | End: 2022-11-08 | Stop reason: SURG

## 2022-11-08 RX ORDER — OMEPRAZOLE 20 MG/1
20 CAPSULE, DELAYED RELEASE ORAL EVERY MORNING
Status: SHIPPED | COMMUNITY
Start: 2022-11-08

## 2022-11-08 RX ORDER — GLYCOPYRROLATE 0.2 MG/ML
INJECTION, SOLUTION INTRAMUSCULAR; INTRAVENOUS AS NEEDED
Status: DISCONTINUED | OUTPATIENT
Start: 2022-11-08 | End: 2022-11-08 | Stop reason: SURG

## 2022-11-08 RX ORDER — NEOSTIGMINE METHYLSULFATE 1 MG/ML
INJECTION, SOLUTION INTRAVENOUS AS NEEDED
Status: DISCONTINUED | OUTPATIENT
Start: 2022-11-08 | End: 2022-11-08 | Stop reason: SURG

## 2022-11-08 RX ORDER — NALOXONE HYDROCHLORIDE 0.4 MG/ML
80 INJECTION, SOLUTION INTRAMUSCULAR; INTRAVENOUS; SUBCUTANEOUS AS NEEDED
Status: DISCONTINUED | OUTPATIENT
Start: 2022-11-08 | End: 2022-11-08

## 2022-11-08 RX ORDER — LIDOCAINE HYDROCHLORIDE 10 MG/ML
INJECTION, SOLUTION EPIDURAL; INFILTRATION; INTRACAUDAL; PERINEURAL AS NEEDED
Status: DISCONTINUED | OUTPATIENT
Start: 2022-11-08 | End: 2022-11-08 | Stop reason: SURG

## 2022-11-08 RX ORDER — ONDANSETRON 2 MG/ML
4 INJECTION INTRAMUSCULAR; INTRAVENOUS EVERY 6 HOURS PRN
Status: DISCONTINUED | OUTPATIENT
Start: 2022-11-08 | End: 2022-11-08

## 2022-11-08 RX ORDER — HYDROCODONE BITARTRATE AND ACETAMINOPHEN 5; 325 MG/1; MG/1
2 TABLET ORAL ONCE AS NEEDED
Status: COMPLETED | OUTPATIENT
Start: 2022-11-08 | End: 2022-11-08

## 2022-11-08 RX ORDER — ROCURONIUM BROMIDE 10 MG/ML
INJECTION, SOLUTION INTRAVENOUS AS NEEDED
Status: DISCONTINUED | OUTPATIENT
Start: 2022-11-08 | End: 2022-11-08 | Stop reason: SURG

## 2022-11-08 RX ORDER — SODIUM CHLORIDE, SODIUM LACTATE, POTASSIUM CHLORIDE, CALCIUM CHLORIDE 600; 310; 30; 20 MG/100ML; MG/100ML; MG/100ML; MG/100ML
INJECTION, SOLUTION INTRAVENOUS CONTINUOUS
Status: DISCONTINUED | OUTPATIENT
Start: 2022-11-08 | End: 2022-11-08

## 2022-11-08 RX ORDER — KETAMINE HYDROCHLORIDE 50 MG/ML
INJECTION, SOLUTION, CONCENTRATE INTRAMUSCULAR; INTRAVENOUS AS NEEDED
Status: DISCONTINUED | OUTPATIENT
Start: 2022-11-08 | End: 2022-11-08 | Stop reason: SURG

## 2022-11-08 RX ORDER — HYDROMORPHONE HYDROCHLORIDE 1 MG/ML
0.4 INJECTION, SOLUTION INTRAMUSCULAR; INTRAVENOUS; SUBCUTANEOUS EVERY 5 MIN PRN
Status: DISCONTINUED | OUTPATIENT
Start: 2022-11-08 | End: 2022-11-08

## 2022-11-08 RX ORDER — SCOLOPAMINE TRANSDERMAL SYSTEM 1 MG/1
1 PATCH, EXTENDED RELEASE TRANSDERMAL ONCE
Status: DISCONTINUED | OUTPATIENT
Start: 2022-11-08 | End: 2022-11-08 | Stop reason: HOSPADM

## 2022-11-08 RX ORDER — DEXAMETHASONE SODIUM PHOSPHATE 4 MG/ML
VIAL (ML) INJECTION AS NEEDED
Status: DISCONTINUED | OUTPATIENT
Start: 2022-11-08 | End: 2022-11-08 | Stop reason: SURG

## 2022-11-08 RX ORDER — BUPIVACAINE HYDROCHLORIDE AND EPINEPHRINE 5; 5 MG/ML; UG/ML
INJECTION, SOLUTION EPIDURAL; INTRACAUDAL; PERINEURAL AS NEEDED
Status: DISCONTINUED | OUTPATIENT
Start: 2022-11-08 | End: 2022-11-08 | Stop reason: HOSPADM

## 2022-11-08 RX ORDER — ONDANSETRON 2 MG/ML
INJECTION INTRAMUSCULAR; INTRAVENOUS AS NEEDED
Status: DISCONTINUED | OUTPATIENT
Start: 2022-11-08 | End: 2022-11-08 | Stop reason: SURG

## 2022-11-08 RX ORDER — HYDROCODONE BITARTRATE AND ACETAMINOPHEN 5; 325 MG/1; MG/1
1 TABLET ORAL EVERY 6 HOURS PRN
Qty: 15 TABLET | Refills: 0 | Status: SHIPPED | OUTPATIENT
Start: 2022-11-08

## 2022-11-08 RX ORDER — HYDROCODONE BITARTRATE AND ACETAMINOPHEN 5; 325 MG/1; MG/1
1 TABLET ORAL ONCE AS NEEDED
Status: COMPLETED | OUTPATIENT
Start: 2022-11-08 | End: 2022-11-08

## 2022-11-08 RX ORDER — ACETAMINOPHEN 500 MG
1000 TABLET ORAL ONCE AS NEEDED
Status: COMPLETED | OUTPATIENT
Start: 2022-11-08 | End: 2022-11-08

## 2022-11-08 RX ORDER — HYDROMORPHONE HYDROCHLORIDE 1 MG/ML
0.2 INJECTION, SOLUTION INTRAMUSCULAR; INTRAVENOUS; SUBCUTANEOUS EVERY 5 MIN PRN
Status: DISCONTINUED | OUTPATIENT
Start: 2022-11-08 | End: 2022-11-08

## 2022-11-08 RX ORDER — ACETAMINOPHEN 500 MG
1000 TABLET ORAL ONCE
Status: DISCONTINUED | OUTPATIENT
Start: 2022-11-08 | End: 2022-11-08 | Stop reason: HOSPADM

## 2022-11-08 RX ADMIN — SODIUM CHLORIDE, SODIUM LACTATE, POTASSIUM CHLORIDE, CALCIUM CHLORIDE: 600; 310; 30; 20 INJECTION, SOLUTION INTRAVENOUS at 15:57:00

## 2022-11-08 RX ADMIN — DEXAMETHASONE SODIUM PHOSPHATE 4 MG: 4 MG/ML VIAL (ML) INJECTION at 14:46:00

## 2022-11-08 RX ADMIN — ONDANSETRON 4 MG: 2 INJECTION INTRAMUSCULAR; INTRAVENOUS at 14:46:00

## 2022-11-08 RX ADMIN — LIDOCAINE HYDROCHLORIDE 50 MG: 10 INJECTION, SOLUTION EPIDURAL; INFILTRATION; INTRACAUDAL; PERINEURAL at 14:40:00

## 2022-11-08 RX ADMIN — NEOSTIGMINE METHYLSULFATE 3 MG: 1 INJECTION, SOLUTION INTRAVENOUS at 15:46:00

## 2022-11-08 RX ADMIN — KETOROLAC TROMETHAMINE 30 MG: 30 INJECTION, SOLUTION INTRAMUSCULAR; INTRAVENOUS at 14:46:00

## 2022-11-08 RX ADMIN — KETAMINE HYDROCHLORIDE 25 MG: 50 INJECTION, SOLUTION, CONCENTRATE INTRAMUSCULAR; INTRAVENOUS at 14:46:00

## 2022-11-08 RX ADMIN — ROCURONIUM BROMIDE 50 MG: 10 INJECTION, SOLUTION INTRAVENOUS at 14:40:00

## 2022-11-08 RX ADMIN — GLYCOPYRROLATE 0.6 MG: 0.2 INJECTION, SOLUTION INTRAMUSCULAR; INTRAVENOUS at 15:46:00

## 2022-11-08 NOTE — ANESTHESIA PROCEDURE NOTES
Airway  Date/Time: 11/8/2022 2:43 PM  Urgency: elective      General Information and Staff    Patient location during procedure: OR  Anesthesiologist: Darnell Dwyer DO  Performed: anesthesiologist     Indications and Patient Condition  Indications for airway management: anesthesia  Spontaneous ventilation: present  Sedation level: deep  Preoxygenated: yes  Patient position: sniffing  Mask difficulty assessment: 1 - vent by mask    Final Airway Details  Final airway type: endotracheal airway      Successful airway: ETT  Cuffed: yes   Successful intubation technique: direct laryngoscopy  Endotracheal tube insertion site: oral  Blade: Michael  Blade size: #4  ETT size (mm): 7.5    Cormack-Lehane Classification: grade I - full view of glottis  Placement verified by: chest auscultation and capnometry   Measured from: lips  ETT to lips (cm): 23  Number of attempts at approach: 1  Ventilation between attempts: none  Number of other approaches attempted: 0    Additional Comments  Atraumatic insertion of ETT  Dentition intact as preinduction

## 2022-11-09 NOTE — OPERATIVE REPORT
[facility], [place]    PATIENT'S NAME: Gay Christensen   ATTENDING PHYSICIAN: Concha Velez D.O.   OPERATING PHYSICIAN: Concha Velez D.O.   PATIENT ACCOUNT#:   [de-identified]    LOCATION:  [address] [place] [id number] [facility] #:   [id number]       YOB: 1970  ADMISSION DATE:       11/08/2022      OPERATION DATE:  11/08/2022    OPERATIVE REPORT      PREOPERATIVE DIAGNOSIS:  Incarcerated ventral hernia. POSTOPERATIVE DIAGNOSIS:  Incarcerated ventral hernia. PROCEDURE:  Ventral herniorrhaphy with mesh and partial omentectomy. ASSISTANT:  Maddie Bright PA-C. ANESTHESIA:  General.    COMPLICATIONS:  None. OPERATIVE TECHNIQUE:  An informed consent was previously obtained. The patient was taken to the operative suite and placed in a supine position. General inhalational anesthetic was administered by the anesthesia department, and the anterior abdomen was prepped and draped in usual sterile fashion. A skin incision was made 3 cm cephalad to the umbilicus and carried along the left-hand side of the umbilicus. Sharp dissection carried down through the skin and subcutaneous tissue. Hemostasis secured using handheld electrocautery. Circumferential dissection was carried out around the hernia sac using handheld electrocautery and then ultimately blunt dissection. The skin was thinned out and dissected off the hernia sac and a Weitlaner retractor was placed in the wound. The hernia sac was then excised and handed off for pathological evaluation. Patient had a large portion of omentum still present within the subcutaneous tissues. It could not be reduced back into the abdominal cavity due to the disproportionate size of the omentum and the fascial defect. Pean clamps were then placed across the omentum. It was divided and portion of omentum excised.   The tissues were then ligated with 2-0 Vicryl suture and the remaining tissue was then able to be reduced back into the abdominal cavity. The preperitoneal layer was developed using handheld electrocautery, and a Ventralex patch was placed in the preperitoneal space and sutured at the 12, 3, 6, and 9 o'clock positions with through-and-through fascial sutures of 2-0 Ethibond suture. The fascia itself was approximated using short runs of figure-of-eight 2-0 Ethibond suture. The skin was then sutured to the fascia using interrupted 3-0 Vicryl suture and skin edges were approximated using 4-0 Monocryl in a running subdermal fashion. Overlying Mastisol and Steri-Strips were applied. Then, 20 mL of 0.5% Marcaine plain injected into the incisions at the completed of the procedure. The patient was transported from the operative suite to Recovery in stable condition.     Dictated By Harpreet Medina D.O.  d: 11/08/2022 16:13:06  t: 11/09/2022 00:25:08  [facility] [id number]  RM/    cc: Diana Mclain D.O.

## 2022-11-22 ENCOUNTER — OFFICE VISIT (OUTPATIENT)
Facility: LOCATION | Age: 52
End: 2022-11-22

## 2022-11-22 VITALS — TEMPERATURE: 97 F | HEART RATE: 73 BPM

## 2022-11-22 DIAGNOSIS — K43.9 VENTRAL HERNIA WITHOUT OBSTRUCTION OR GANGRENE: Primary | ICD-10-CM

## 2022-11-22 PROCEDURE — 99024 POSTOP FOLLOW-UP VISIT: CPT | Performed by: SURGERY

## 2022-12-30 RX ORDER — ATORVASTATIN CALCIUM 10 MG/1
10 TABLET, FILM COATED ORAL NIGHTLY
Qty: 90 TABLET | Refills: 0 | Status: SHIPPED | OUTPATIENT
Start: 2022-12-30

## 2022-12-30 NOTE — TELEPHONE ENCOUNTER
Patient's name and  verified   Patient will call back to schedule physical   Patient notified and verbalized an understanding

## 2023-03-14 RX ORDER — ATORVASTATIN CALCIUM 10 MG/1
10 TABLET, FILM COATED ORAL NIGHTLY
Qty: 90 TABLET | Refills: 0 | Status: SHIPPED | OUTPATIENT
Start: 2023-03-14

## 2023-03-14 RX ORDER — DILTIAZEM HYDROCHLORIDE 120 MG/1
120 CAPSULE, COATED, EXTENDED RELEASE ORAL 2 TIMES DAILY
Qty: 180 CAPSULE | Refills: 0 | Status: SHIPPED | OUTPATIENT
Start: 2023-03-14

## 2023-06-12 RX ORDER — DILTIAZEM HYDROCHLORIDE 120 MG/1
120 CAPSULE, COATED, EXTENDED RELEASE ORAL 2 TIMES DAILY
Qty: 180 CAPSULE | Refills: 0 | Status: SHIPPED | OUTPATIENT
Start: 2023-06-12

## 2023-06-13 NOTE — TELEPHONE ENCOUNTER
Left message detail message per (HIPPA form) with  recommendation.  Patient to call back to schedule yearly physical and fasting labs

## 2023-07-05 RX ORDER — ATORVASTATIN CALCIUM 10 MG/1
10 TABLET, FILM COATED ORAL NIGHTLY
Qty: 90 TABLET | Refills: 0 | Status: SHIPPED | OUTPATIENT
Start: 2023-07-05

## 2023-08-10 ENCOUNTER — OFFICE VISIT (OUTPATIENT)
Dept: FAMILY MEDICINE CLINIC | Facility: CLINIC | Age: 53
End: 2023-08-10
Payer: COMMERCIAL

## 2023-08-10 VITALS
SYSTOLIC BLOOD PRESSURE: 138 MMHG | DIASTOLIC BLOOD PRESSURE: 85 MMHG | BODY MASS INDEX: 43 KG/M2 | OXYGEN SATURATION: 97 % | HEART RATE: 75 BPM | RESPIRATION RATE: 16 BRPM | WEIGHT: 298 LBS | TEMPERATURE: 97 F

## 2023-08-10 DIAGNOSIS — I10 HYPERTENSION, UNSPECIFIED TYPE: ICD-10-CM

## 2023-08-10 DIAGNOSIS — E78.00 HIGH CHOLESTEROL: ICD-10-CM

## 2023-08-10 DIAGNOSIS — K21.9 GASTROESOPHAGEAL REFLUX DISEASE, UNSPECIFIED WHETHER ESOPHAGITIS PRESENT: ICD-10-CM

## 2023-08-10 DIAGNOSIS — Z00.00 WELL ADULT EXAM: Primary | ICD-10-CM

## 2023-08-10 LAB
ALT SERPL-CCNC: 55 U/L
ANION GAP SERPL CALC-SCNC: 3 MMOL/L (ref 0–18)
AST SERPL-CCNC: 32 U/L (ref 15–37)
BUN BLD-MCNC: 12 MG/DL (ref 7–18)
CALCIUM BLD-MCNC: 9.5 MG/DL (ref 8.5–10.1)
CHLORIDE SERPL-SCNC: 109 MMOL/L (ref 98–112)
CHOLEST SERPL-MCNC: 195 MG/DL (ref ?–200)
CO2 SERPL-SCNC: 26 MMOL/L (ref 21–32)
CREAT BLD-MCNC: 0.92 MG/DL
EGFRCR SERPLBLD CKD-EPI 2021: 100 ML/MIN/1.73M2 (ref 60–?)
ERYTHROCYTE [DISTWIDTH] IN BLOOD BY AUTOMATED COUNT: 12.6 %
FASTING PATIENT LIPID ANSWER: YES
FASTING STATUS PATIENT QL REPORTED: YES
GLUCOSE BLD-MCNC: 104 MG/DL (ref 70–99)
HCT VFR BLD AUTO: 48.2 %
HDLC SERPL-MCNC: 50 MG/DL (ref 40–59)
HGB BLD-MCNC: 16.1 G/DL
LDLC SERPL CALC-MCNC: 118 MG/DL (ref ?–100)
MCH RBC QN AUTO: 30.3 PG (ref 26–34)
MCHC RBC AUTO-ENTMCNC: 33.4 G/DL (ref 31–37)
MCV RBC AUTO: 90.6 FL
NONHDLC SERPL-MCNC: 145 MG/DL (ref ?–130)
OSMOLALITY SERPL CALC.SUM OF ELEC: 286 MOSM/KG (ref 275–295)
PLATELET # BLD AUTO: 250 10(3)UL (ref 150–450)
POTASSIUM SERPL-SCNC: 4.9 MMOL/L (ref 3.5–5.1)
PSA SERPL-MCNC: 0.53 NG/ML (ref ?–4)
RBC # BLD AUTO: 5.32 X10(6)UL
SODIUM SERPL-SCNC: 138 MMOL/L (ref 136–145)
T4 FREE SERPL-MCNC: 0.9 NG/DL (ref 0.8–1.7)
TRIGL SERPL-MCNC: 154 MG/DL (ref 30–149)
TSI SER-ACNC: 1.56 MIU/ML (ref 0.36–3.74)
VLDLC SERPL CALC-MCNC: 27 MG/DL (ref 0–30)
WBC # BLD AUTO: 5.4 X10(3) UL (ref 4–11)

## 2023-08-10 PROCEDURE — 84443 ASSAY THYROID STIM HORMONE: CPT | Performed by: FAMILY MEDICINE

## 2023-08-10 PROCEDURE — 3075F SYST BP GE 130 - 139MM HG: CPT | Performed by: FAMILY MEDICINE

## 2023-08-10 PROCEDURE — 84153 ASSAY OF PSA TOTAL: CPT | Performed by: FAMILY MEDICINE

## 2023-08-10 PROCEDURE — 85027 COMPLETE CBC AUTOMATED: CPT | Performed by: FAMILY MEDICINE

## 2023-08-10 PROCEDURE — 80061 LIPID PANEL: CPT | Performed by: FAMILY MEDICINE

## 2023-08-10 PROCEDURE — 80048 BASIC METABOLIC PNL TOTAL CA: CPT | Performed by: FAMILY MEDICINE

## 2023-08-10 PROCEDURE — 3079F DIAST BP 80-89 MM HG: CPT | Performed by: FAMILY MEDICINE

## 2023-08-10 PROCEDURE — 84460 ALANINE AMINO (ALT) (SGPT): CPT | Performed by: FAMILY MEDICINE

## 2023-08-10 PROCEDURE — 84450 TRANSFERASE (AST) (SGOT): CPT | Performed by: FAMILY MEDICINE

## 2023-08-10 PROCEDURE — 99396 PREV VISIT EST AGE 40-64: CPT | Performed by: FAMILY MEDICINE

## 2023-08-10 PROCEDURE — 84439 ASSAY OF FREE THYROXINE: CPT | Performed by: FAMILY MEDICINE

## 2023-08-23 ENCOUNTER — HOSPITAL ENCOUNTER (OUTPATIENT)
Dept: CT IMAGING | Age: 53
Discharge: HOME | End: 2023-08-23
Attending: FAMILY MEDICINE

## 2023-08-23 DIAGNOSIS — E78.00 HIGH CHOLESTEROL: ICD-10-CM

## 2023-08-23 DIAGNOSIS — I10 HYPERTENSION, UNSPECIFIED TYPE: ICD-10-CM

## 2023-09-10 RX ORDER — DILTIAZEM HYDROCHLORIDE 120 MG/1
120 CAPSULE, COATED, EXTENDED RELEASE ORAL 2 TIMES DAILY
Qty: 180 CAPSULE | Refills: 3 | Status: SHIPPED | OUTPATIENT
Start: 2023-09-10

## 2023-10-03 RX ORDER — ATORVASTATIN CALCIUM 10 MG/1
10 TABLET, FILM COATED ORAL NIGHTLY
Qty: 90 TABLET | Refills: 3 | Status: SHIPPED | OUTPATIENT
Start: 2023-10-03

## 2023-10-04 ENCOUNTER — PATIENT MESSAGE (OUTPATIENT)
Dept: FAMILY MEDICINE CLINIC | Facility: CLINIC | Age: 53
End: 2023-10-04

## 2023-10-04 RX ORDER — DILTIAZEM HYDROCHLORIDE 120 MG/1
120 CAPSULE, COATED, EXTENDED RELEASE ORAL 2 TIMES DAILY
Qty: 180 CAPSULE | Refills: 3 | Status: SHIPPED | OUTPATIENT
Start: 2023-10-04

## 2023-10-04 NOTE — TELEPHONE ENCOUNTER
From: Alida Wade  To: Mavis Caro  Sent: 10/4/2023 11:02 AM CDT  Subject: Covid Vaccination Update    I just got the [address] Vaccine @ [facility]. Could you please have your associates update my records? I also got the flu shot as well but I was able to update that on [employer]. Thanks and see you soon at basketball games!

## 2023-10-08 RX ORDER — OMEPRAZOLE 10 MG/1
10 CAPSULE, DELAYED RELEASE ORAL DAILY
Qty: 90 CAPSULE | Refills: 3 | Status: SHIPPED | OUTPATIENT
Start: 2023-10-08

## 2024-07-15 ENCOUNTER — WALK IN (OUTPATIENT)
Dept: URGENT CARE | Age: 54
End: 2024-07-15

## 2024-07-15 VITALS
OXYGEN SATURATION: 96 % | RESPIRATION RATE: 18 BRPM | SYSTOLIC BLOOD PRESSURE: 165 MMHG | HEART RATE: 76 BPM | TEMPERATURE: 97.5 F | DIASTOLIC BLOOD PRESSURE: 95 MMHG

## 2024-07-15 DIAGNOSIS — L73.9 FOLLICULITIS: Primary | ICD-10-CM

## 2024-07-15 PROCEDURE — 99203 OFFICE O/P NEW LOW 30 MIN: CPT | Performed by: FAMILY MEDICINE

## 2024-07-15 RX ORDER — ATORVASTATIN CALCIUM 10 MG/1
10 TABLET, FILM COATED ORAL NIGHTLY
COMMUNITY
Start: 2024-06-08

## 2024-07-15 RX ORDER — OMEPRAZOLE 10 MG/1
CAPSULE, DELAYED RELEASE ORAL
COMMUNITY
Start: 2024-06-11

## 2024-07-15 RX ORDER — DILTIAZEM HYDROCHLORIDE 120 MG/1
CAPSULE, COATED, EXTENDED RELEASE ORAL
COMMUNITY
Start: 2024-06-08

## 2024-07-15 RX ORDER — PREDNISONE 20 MG/1
40 TABLET ORAL DAILY
Qty: 10 TABLET | Refills: 0 | Status: SHIPPED | OUTPATIENT
Start: 2024-07-15 | End: 2024-07-20

## 2024-09-06 RX ORDER — OMEPRAZOLE 10 MG/1
10 CAPSULE, DELAYED RELEASE ORAL DAILY
Qty: 90 CAPSULE | Refills: 0 | OUTPATIENT
Start: 2024-09-06

## 2024-09-06 RX ORDER — DILTIAZEM HYDROCHLORIDE 120 MG/1
120 CAPSULE, COATED, EXTENDED RELEASE ORAL 2 TIMES DAILY
Qty: 180 CAPSULE | Refills: 0 | Status: SHIPPED | OUTPATIENT
Start: 2024-09-06

## 2024-09-06 RX ORDER — OMEPRAZOLE 10 MG/1
10 CAPSULE, DELAYED RELEASE ORAL DAILY
Qty: 90 CAPSULE | Refills: 0 | Status: SHIPPED | OUTPATIENT
Start: 2024-09-06

## 2024-09-06 RX ORDER — ATORVASTATIN CALCIUM 10 MG/1
10 TABLET, FILM COATED ORAL NIGHTLY
Qty: 90 TABLET | Refills: 0 | OUTPATIENT
Start: 2024-09-06

## 2024-09-06 RX ORDER — ATORVASTATIN CALCIUM 10 MG/1
10 TABLET, FILM COATED ORAL NIGHTLY
Qty: 90 TABLET | Refills: 0 | Status: SHIPPED | OUTPATIENT
Start: 2024-09-06

## 2024-09-06 NOTE — TELEPHONE ENCOUNTER
Please let patient or caregiver know or leave message that refill request for the medication/s listed below has/have come to our office. The refills have been sent.  It appears the patient is due for a yearly physical and fasting labs. Please help schedule this in the next month or so.  Thanks      Requested Prescriptions     Signed Prescriptions Disp Refills    atorvastatin 10 MG Oral Tab 90 tablet 0     Sig: Take 1 tablet (10 mg total) by mouth nightly.     Authorizing Provider: YVONNE GORDILLO    dilTIAZem ER (CARTIA XT) 120 MG Oral Capsule SR 24 Hr 180 capsule 0     Sig: Take 1 capsule (120 mg total) by mouth 2 (two) times daily.     Authorizing Provider: YVONNE GORDILLO    Omeprazole 10 MG Oral Capsule Delayed Release 90 capsule 0     Sig: Take 1 capsule (10 mg total) by mouth daily.     Authorizing Provider: YVONNE GORDILLO

## 2024-09-11 NOTE — TELEPHONE ENCOUNTER
Patient's name and  verified   Will call back to schedule   Patient notified and verbalized an understanding

## 2024-10-23 ENCOUNTER — OFFICE VISIT (OUTPATIENT)
Dept: FAMILY MEDICINE CLINIC | Facility: CLINIC | Age: 54
End: 2024-10-23
Payer: COMMERCIAL

## 2024-10-23 VITALS
DIASTOLIC BLOOD PRESSURE: 80 MMHG | WEIGHT: 293 LBS | SYSTOLIC BLOOD PRESSURE: 132 MMHG | BODY MASS INDEX: 41.95 KG/M2 | HEART RATE: 70 BPM | TEMPERATURE: 97 F | HEIGHT: 70 IN | OXYGEN SATURATION: 97 %

## 2024-10-23 DIAGNOSIS — Z00.00 WELL ADULT EXAM: Primary | ICD-10-CM

## 2024-10-23 DIAGNOSIS — I10 HYPERTENSION, UNSPECIFIED TYPE: ICD-10-CM

## 2024-10-23 DIAGNOSIS — K21.9 GASTROESOPHAGEAL REFLUX DISEASE, UNSPECIFIED WHETHER ESOPHAGITIS PRESENT: ICD-10-CM

## 2024-10-23 DIAGNOSIS — M79.671 FOOT PAIN, RIGHT: ICD-10-CM

## 2024-10-23 DIAGNOSIS — E78.00 HIGH CHOLESTEROL: ICD-10-CM

## 2024-10-23 LAB
ALT SERPL-CCNC: 27 U/L
ANION GAP SERPL CALC-SCNC: 4 MMOL/L (ref 0–18)
AST SERPL-CCNC: 26 U/L (ref ?–34)
BUN BLD-MCNC: 14 MG/DL (ref 9–23)
CALCIUM BLD-MCNC: 10.2 MG/DL (ref 8.7–10.4)
CHLORIDE SERPL-SCNC: 108 MMOL/L (ref 98–112)
CHOLEST SERPL-MCNC: 176 MG/DL (ref ?–200)
CO2 SERPL-SCNC: 28 MMOL/L (ref 21–32)
COMPLEXED PSA SERPL-MCNC: 0.39 NG/ML (ref ?–4)
CREAT BLD-MCNC: 0.94 MG/DL
EGFRCR SERPLBLD CKD-EPI 2021: 97 ML/MIN/1.73M2 (ref 60–?)
ERYTHROCYTE [DISTWIDTH] IN BLOOD BY AUTOMATED COUNT: 12.4 %
FASTING PATIENT LIPID ANSWER: YES
FASTING STATUS PATIENT QL REPORTED: YES
GLUCOSE BLD-MCNC: 108 MG/DL (ref 70–99)
HCT VFR BLD AUTO: 45.9 %
HDLC SERPL-MCNC: 46 MG/DL (ref 40–59)
HGB BLD-MCNC: 15.5 G/DL
LDLC SERPL CALC-MCNC: 95 MG/DL (ref ?–100)
MCH RBC QN AUTO: 30.1 PG (ref 26–34)
MCHC RBC AUTO-ENTMCNC: 33.8 G/DL (ref 31–37)
MCV RBC AUTO: 89.1 FL
NONHDLC SERPL-MCNC: 130 MG/DL (ref ?–130)
OSMOLALITY SERPL CALC.SUM OF ELEC: 291 MOSM/KG (ref 275–295)
PLATELET # BLD AUTO: 263 10(3)UL (ref 150–450)
POTASSIUM SERPL-SCNC: 4.5 MMOL/L (ref 3.5–5.1)
RBC # BLD AUTO: 5.15 X10(6)UL
SODIUM SERPL-SCNC: 140 MMOL/L (ref 136–145)
TRIGL SERPL-MCNC: 206 MG/DL (ref 30–149)
TSI SER-ACNC: 1.82 MIU/ML (ref 0.55–4.78)
VIT B12 SERPL-MCNC: 511 PG/ML (ref 211–911)
VIT D+METAB SERPL-MCNC: 36.6 NG/ML (ref 30–100)
VLDLC SERPL CALC-MCNC: 34 MG/DL (ref 0–30)
WBC # BLD AUTO: 8.5 X10(3) UL (ref 4–11)

## 2024-10-23 PROCEDURE — 84153 ASSAY OF PSA TOTAL: CPT | Performed by: FAMILY MEDICINE

## 2024-10-23 PROCEDURE — 80048 BASIC METABOLIC PNL TOTAL CA: CPT | Performed by: FAMILY MEDICINE

## 2024-10-23 PROCEDURE — 84450 TRANSFERASE (AST) (SGOT): CPT | Performed by: FAMILY MEDICINE

## 2024-10-23 PROCEDURE — 84443 ASSAY THYROID STIM HORMONE: CPT | Performed by: FAMILY MEDICINE

## 2024-10-23 PROCEDURE — 80061 LIPID PANEL: CPT | Performed by: FAMILY MEDICINE

## 2024-10-23 PROCEDURE — 85027 COMPLETE CBC AUTOMATED: CPT | Performed by: FAMILY MEDICINE

## 2024-10-23 PROCEDURE — 82306 VITAMIN D 25 HYDROXY: CPT | Performed by: FAMILY MEDICINE

## 2024-10-23 PROCEDURE — 82607 VITAMIN B-12: CPT | Performed by: FAMILY MEDICINE

## 2024-10-23 PROCEDURE — 84460 ALANINE AMINO (ALT) (SGPT): CPT | Performed by: FAMILY MEDICINE

## 2024-10-23 NOTE — PROGRESS NOTES
Jeremy Castañeda is a 54 year old male.    CC:    Chief Complaint   Patient presents with    Physical       HPI:  Yearly PX    Last PSA:   Recent Results (from the past 422357 hours)   PSA Total, Diagnostic    Collection Time: 08/10/23 10:51 AM   Result Value Ref Range    PSA 0.53 <=4.00 ng/mL     *Note: Due to a large number of results and/or encounters for the requested time period, some results have not been displayed. A complete set of results can be found in Results Review.        Last lipid:  Lab Results   Component Value Date    CHOLEST 195 08/10/2023    TRIG 154 (H) 08/10/2023    HDL 50 08/10/2023     (H) 08/10/2023    VLDL 27 08/10/2023    TCHDLRATIO 5.29 (H) 05/04/2018    NONHDLC 145 (H) 08/10/2023   The 10-year ASCVD risk score (Primo ORLANDO, et al., 2019) is: 5.6%    Values used to calculate the score:      Age: 54 years      Sex: Male      Is Non- : No      Diabetic: No      Tobacco smoker: No      Systolic Blood Pressure: 132 mmHg      Is BP treated: Yes      HDL Cholesterol: 50 mg/dL      Total Cholesterol: 195 mg/dL      Last Colon Cancer screening: Colonoscopy 10/14/2020 to be repeated 2030      Immunization History   Administered Date(s) Administered    Covid-19 Vaccine Moderna 100 mcg/0.5 ml 02/10/2021, 03/10/2021, 11/06/2021    Covid-19 Vaccine Moderna Bivalent 50mcg/0.5mL 12+ years 11/10/2022    Fluvirin, 3 Years & >, Im 10/04/2017, 09/14/2021    Influenza 10/07/2015, 10/05/2016, 10/03/2018, 10/11/2022, 10/04/2023, 10/17/2024    Influenza(Afluria)0.5ml QIV PFS 10/09/2019, 09/15/2020    Moderna Covid-19 Vaccine 50mcg/0.5ml 12yrs+ 10/04/2023    Pfizer Covid-19 Vaccine 30mcg/0.3ml 12yrs+ 10/17/2024    TDAP 04/22/2011, 11/30/2021       Patient Active Problem List   Diagnosis    High cholesterol: statin    Hypertension: CCB    GERD (gastroesophageal reflux disease): low dose PPI, no dysphagia                R foot, arch pain for a few months. Described as an ache and  fatigue. Worse at the end of his day. The pain can travel up the shin. No trauma.   Allergies:  Allergies[1]   Current Meds:  Current Outpatient Medications   Medication Sig Dispense Refill    atorvastatin 10 MG Oral Tab Take 1 tablet (10 mg total) by mouth nightly. 90 tablet 0    dilTIAZem ER (CARTIA XT) 120 MG Oral Capsule SR 24 Hr Take 1 capsule (120 mg total) by mouth 2 (two) times daily. 180 capsule 0    Omeprazole 10 MG Oral Capsule Delayed Release Take 1 capsule (10 mg total) by mouth daily. 90 capsule 0        History:  Past Medical History:    Arthritis    B knees    GERD (gastroesophageal reflux disease)    Heartburn    Hyperlipidemia    Hypertension    POOJA (obstructive sleep apnea)    He has stopped CPAP in 2018    Sleep apnea    no tx    Visual impairment    reading glasses      Past Surgical History:   Procedure Laterality Date    Hernia repair  2022    Ventral hernia, Dr. Allen    Hernia surgery      R inguinal    Knee replacement surgery Left 05/2020    Knee replacement surgery Right 05/2021    Other surgical history      R tib/fib ORIF    Other surgical history      B knee meniscectomy    Tonsillectomy      Upper gi endoscopy,biopsy N/A 05/12/2015    Procedure: ESOPHAGOGASTRODUODENOSCOPY, POSSIBLE BIOPSY, POSSIBLE POLYPECTOMY 18196;  Surgeon: Ricardo Freedman MD;  Location: [facility]    Vasectomy        Family History   Problem Relation Age of Onset    High Cholesterol Mother     Alcohol and Other Disorders Associated Paternal Grandfather       Family Status   Relation Status    Mo Alive    Fa Alive    PGFA (Not Specified)      Social History     Socioeconomic History    Marital status:    Occupational History    Occupation: Teacher   Tobacco Use    Smoking status: Never    Smokeless tobacco: Never   Vaping Use    Vaping status: Never Used   Substance and Sexual Activity    Alcohol use: Not Currently     Comment: socially    Drug use: No     Social Drivers of Health      Received from [facility], [facility]    Social Connections    Received from [facility], [facility]    Housing Stability        ROS:  General: energy level stable      Vitals: /80   Pulse 70   Temp 97 °F (36.1 °C) (Temporal)   Ht 5' 10\" (1.778 m)   Wt 293 lb (132.9 kg)   SpO2 97%   BMI 42.04 kg/m²    Reviewed by KESHAV Muñoz M.D.  Wt Readings from Last 5 Encounters:   10/23/24 293 lb (132.9 kg)   08/10/23 298 lb (135.2 kg)   11/08/22 289 lb (131.1 kg)   11/30/21 267 lb (121.1 kg)   04/28/21 265 lb (120.2 kg)       Physical Exam:  GEN: well developed, well nourished, in no apparent distress  EYE: B conjunctiva and lids normal  HENT: normocephalic; normal nose, pharynx and TM's  NECK: No lymphadenopathy, thyromegaly or masses  CAR: S1, S2 normal, RRR; no S3, no S4; no click; murmur negative  PULM: clear to auscultation B, no accessory muscle use  GI: normal active BS+, soft, nondistended; no HSM; no masses; no bruits; no masses; nontender, no G/R/R   PSYCH: alert and oriented x 3; affect appropriate  SKIN: not examined  EXTREMITIES: No clubbing, cyanosis or edema  GENITAL: not examined  LYMPH: no supraclavicular nodes  NEURO: Awake and alert. Normal speech and articulation. No facial droop or asymmetry. Moving all extremities equally.  MS: R foot with some tenderness at the mid arch, no tenderness at the insertion of the plantar fascia onto the calcaneus    ASSESSMENT AND PLAN    1. Well adult exam  Await results   Colon cancer screen in 2030  - Vitamin D; Future  - Vitamin B12; Future  - ALT(SGPT); Future  - AST (SGOT); Future  - Basic Metabolic Panel (8); Future  - CBC, Platelet; No Differential; Future  - PSA Total, Screen; Future  - Lipid Panel; Future  - TSH W Reflex To Free T4; Future    2. High cholesterol  Await results   Encouraged ongoing weight loss.   - ALT(SGPT); Future  - AST (SGOT); Future  - Lipid Panel; Future    3. Hypertension,  unspecified type  Stable   Continue present medications   Await results   - Basic Metabolic Panel (8); Future    4. Gastroesophageal reflux disease, unspecified whether esophagitis present  Stable   Continue present medications   Await results   - Vitamin D; Future  - Vitamin B12; Future  - CBC, Platelet; No Differential; Future    5. Foot pain, right  May have an arch issue vs posterior tibialis tendinopathy  Offered PT and/or Podiatry consults. He defers for now as the pain is not bad. The patient will You Tube search Posterior Tibialis stretches and exercise to be done at home. He can contact me for the above consults if desired.       No follow-ups on file.    Orders for this visit:    Orders Placed This Encounter   Procedures    Vitamin D     Standing Status:   Future     Standing Expiration Date:   10/23/2025     Order Specific Question:   Please pick the scenario that best fits the purpose for ordering this test     Answer:   General Screening/Vit D deficiency (25-Hydroxy)     Order Specific Question:   Release to patient     Answer:   Immediate    Vitamin B12     Standing Status:   Future     Standing Expiration Date:   10/23/2025    ALT(SGPT)     Standing Status:   Future     Standing Expiration Date:   10/23/2025    AST (SGOT)     Standing Status:   Future     Standing Expiration Date:   10/23/2025    Basic Metabolic Panel (8)     Standing Status:   Future     Standing Expiration Date:   10/23/2025    CBC, Platelet; No Differential     Standing Status:   Future     Standing Expiration Date:   10/23/2025    PSA Total, Screen     Standing Status:   Future     Standing Expiration Date:   10/23/2025    Lipid Panel     Standing Status:   Future     Standing Expiration Date:   10/23/2025    TSH W Reflex To Free T4     Standing Status:   Future     Standing Expiration Date:   10/23/2025       None    Meds & Refills for this Visit:  Requested Prescriptions      No prescriptions requested or ordered in this  encounter             Authorized by Isaias Muñoz M.D.           [1] No Known Allergies

## 2024-12-15 RX ORDER — ATORVASTATIN CALCIUM 10 MG/1
10 TABLET, FILM COATED ORAL NIGHTLY
Qty: 90 TABLET | Refills: 3 | Status: SHIPPED | OUTPATIENT
Start: 2024-12-15

## 2024-12-15 RX ORDER — OMEPRAZOLE 10 MG/1
10 CAPSULE, DELAYED RELEASE ORAL DAILY
Qty: 90 CAPSULE | Refills: 3 | Status: SHIPPED | OUTPATIENT
Start: 2024-12-15

## 2024-12-15 RX ORDER — DILTIAZEM HYDROCHLORIDE 120 MG/1
120 CAPSULE, COATED, EXTENDED RELEASE ORAL 2 TIMES DAILY
Qty: 180 CAPSULE | Refills: 3 | Status: SHIPPED | OUTPATIENT
Start: 2024-12-15

## (undated) DEVICE — 3M™ TEGADERM™ TRANSPARENT FILM DRESSING, 1626W, 4 IN X 4-3/4 IN (10 CM X 12 CM), 50 EACH/CARTON, 4 CARTON/CASE: Brand: 3M™ TEGADERM™

## (undated) DEVICE — ADHESIVE MASTISOL 2/3ML

## (undated) DEVICE — STERILE SYNTHETIC POLYISOPRENE POWDER-FREE SURGICAL GLOVES WITH HYDROGEL COATING: Brand: PROTEXIS

## (undated) DEVICE — SUT ETHIBOND 2-0 SH CX12D

## (undated) DEVICE — SUT VICRYL 3-0 SH J416H

## (undated) DEVICE — BINDER ABDOMINAL 0-45IN 3PANEL

## (undated) DEVICE — SUT MONOCRYL 4-0 PS-2 Y496G

## (undated) DEVICE — SUT ETHIBOND 2-0 CT-2 X411H

## (undated) DEVICE — SPNG GZ W4XL4IN COT 12 PLY TYP

## (undated) DEVICE — LAPAROTOMY CDS: Brand: MEDLINE INDUSTRIES, INC.

## (undated) DEVICE — 3M™ STERI-STRIP™ REINFORCED ADHESIVE SKIN CLOSURES, R1547, 1/2 IN X 4 IN (12 MM X 100 MM), 6 STRIPS/ENVELOPE: Brand: 3M™ STERI-STRIP™

## (undated) DEVICE — SOLUTION  .9 1000ML BTL

## (undated) DEVICE — VIOLET BRAIDED (POLYGLACTIN 910), SYNTHETIC ABSORBABLE SUTURE: Brand: COATED VICRYL

## (undated) DEVICE — SLEEVE KENDALL SCD EXPRESS MED

## (undated) DEVICE — LIGHT HANDLE

## (undated) NOTE — Clinical Note
Lucille Hoffman in the office today. He presents with a large umbilical hernia. He is currently scheduled for repair.   Thank you Luigi Schuler